# Patient Record
Sex: MALE | Race: WHITE | Employment: OTHER | URBAN - NONMETROPOLITAN AREA
[De-identification: names, ages, dates, MRNs, and addresses within clinical notes are randomized per-mention and may not be internally consistent; named-entity substitution may affect disease eponyms.]

---

## 2017-01-01 ENCOUNTER — HOSPITAL ENCOUNTER (OUTPATIENT)
Dept: LAB | Age: 82
Discharge: HOME | End: 2017-01-31
Attending: FAMILY MEDICINE | Admitting: FAMILY MEDICINE

## 2017-01-06 LAB
INR BLD: 2.82 INDEX
PROTHROMBIN TIME: 34.1 SECONDS (ref 10–14.3)

## 2017-02-01 ENCOUNTER — HOSPITAL ENCOUNTER (OUTPATIENT)
Dept: LAB | Age: 82
Discharge: HOME | End: 2017-02-28
Attending: FAMILY MEDICINE | Admitting: FAMILY MEDICINE

## 2017-02-02 LAB
INR BLD: 2.75 INDEX
PROTHROMBIN TIME: 33.1 SECONDS (ref 10–14.3)

## 2017-02-20 ENCOUNTER — OFFICE VISIT (OUTPATIENT)
Dept: CARDIOLOGY CLINIC | Age: 82
End: 2017-02-20

## 2017-02-20 VITALS
HEIGHT: 70 IN | HEART RATE: 60 BPM | DIASTOLIC BLOOD PRESSURE: 58 MMHG | SYSTOLIC BLOOD PRESSURE: 126 MMHG | WEIGHT: 163 LBS | BODY MASS INDEX: 23.34 KG/M2

## 2017-02-20 DIAGNOSIS — R07.9 CHEST PAIN, UNSPECIFIED TYPE: ICD-10-CM

## 2017-02-20 DIAGNOSIS — I48.0 PAF (PAROXYSMAL ATRIAL FIBRILLATION) (HCC): Primary | ICD-10-CM

## 2017-02-20 DIAGNOSIS — I25.83 CORONARY ARTERY DISEASE DUE TO LIPID RICH PLAQUE: ICD-10-CM

## 2017-02-20 DIAGNOSIS — Z95.0 CARDIAC PACEMAKER: ICD-10-CM

## 2017-02-20 DIAGNOSIS — I25.10 CORONARY ARTERY DISEASE DUE TO LIPID RICH PLAQUE: ICD-10-CM

## 2017-02-20 DIAGNOSIS — H91.93 HOH (HARD OF HEARING), BILATERAL: ICD-10-CM

## 2017-02-20 DIAGNOSIS — I10 ESSENTIAL HYPERTENSION: ICD-10-CM

## 2017-02-20 PROCEDURE — 99214 OFFICE O/P EST MOD 30 MIN: CPT | Performed by: INTERNAL MEDICINE

## 2017-02-20 RX ORDER — ISOSORBIDE MONONITRATE 30 MG/1
60 TABLET, EXTENDED RELEASE ORAL DAILY
Qty: 90 TABLET | Refills: 3 | Status: SHIPPED | OUTPATIENT
Start: 2017-02-20 | End: 2018-03-26 | Stop reason: SDUPTHER

## 2017-02-21 ENCOUNTER — HOSPITAL ENCOUNTER (OUTPATIENT)
Dept: LAB | Age: 82
Discharge: HOME | End: 2017-02-21
Attending: INTERNAL MEDICINE | Admitting: INTERNAL MEDICINE

## 2017-02-21 LAB
ALBUMIN SERPL-MCNC: 4.4 GM/DL (ref 3.4–5)
ALP BLD-CCNC: 78 IU/L (ref 40–129)
ALT SERPL-CCNC: 14 U/L (ref 10–40)
ANION GAP SERPL CALCULATED.3IONS-SCNC: 9 MMOL/L (ref 4–16)
AST SERPL-CCNC: 25 IU/L (ref 15–37)
BASOPHILS ABSOLUTE: 0 K/CU MM
BASOPHILS RELATIVE PERCENT: 0.4 % (ref 0–1)
BILIRUB SERPL-MCNC: 0.5 MG/DL (ref 0–1)
BUN BLDV-MCNC: 24 MG/DL (ref 6–23)
CALCIUM SERPL-MCNC: 9.3 MG/DL (ref 8.3–10.6)
CHLORIDE BLD-SCNC: 98 MMOL/L (ref 99–110)
CO2: 31 MMOL/L (ref 21–32)
CREAT SERPL-MCNC: 1.2 MG/DL (ref 0.9–1.3)
DIFFERENTIAL TYPE: ABNORMAL
EOSINOPHILS ABSOLUTE: 0.1 K/CU MM
EOSINOPHILS RELATIVE PERCENT: 1.2 % (ref 0–3)
GFR AFRICAN AMERICAN: >60 ML/MIN/1.73M2
GFR NON-AFRICAN AMERICAN: 57 ML/MIN/1.73M2
GLUCOSE FASTING: 110 MG/DL (ref 70–99)
HCT VFR BLD CALC: 43.7 % (ref 42–52)
HEMOGLOBIN: 14.4 GM/DL (ref 13.5–18)
IMMATURE NEUTROPHIL %: 0.2 % (ref 0–0.43)
LYMPHOCYTES ABSOLUTE: 1.1 K/CU MM
LYMPHOCYTES RELATIVE PERCENT: 21.8 % (ref 24–44)
MCH RBC QN AUTO: 30.3 PG (ref 27–31)
MCHC RBC AUTO-ENTMCNC: 33 % (ref 32–36)
MCV RBC AUTO: 92 FL (ref 78–100)
MONOCYTES ABSOLUTE: 0.8 K/CU MM
MONOCYTES RELATIVE PERCENT: 15.1 % (ref 0–4)
PDW BLD-RTO: 13.5 % (ref 11.7–14.9)
PLATELET # BLD: 152 K/CU MM (ref 140–440)
PMV BLD AUTO: 9.5 FL (ref 7.5–11.1)
POTASSIUM SERPL-SCNC: 4.1 MMOL/L (ref 3.5–5.1)
RBC # BLD: 4.75 M/CU MM (ref 4.6–6.2)
SEGMENTED NEUTROPHILS ABSOLUTE COUNT: 3.2 K/CU MM
SEGMENTED NEUTROPHILS RELATIVE PERCENT: 61.3 % (ref 36–66)
SODIUM BLD-SCNC: 138 MMOL/L (ref 135–145)
TOTAL IMMATURE NEUTOROPHIL: 0.01 K/CU MM
TOTAL PROTEIN: 7 GM/DL (ref 6.4–8.2)
TSH HIGH SENSITIVITY: 4.61 UIU/ML (ref 0.27–4.2)
WBC # BLD: 5.2 K/CU MM (ref 4–10.5)

## 2017-02-28 ENCOUNTER — PROCEDURE VISIT (OUTPATIENT)
Dept: CARDIOLOGY CLINIC | Age: 82
End: 2017-02-28

## 2017-02-28 ENCOUNTER — TELEPHONE (OUTPATIENT)
Dept: CARDIOLOGY CLINIC | Age: 82
End: 2017-02-28

## 2017-02-28 DIAGNOSIS — R07.89 OTHER CHEST PAIN: Primary | ICD-10-CM

## 2017-02-28 LAB
LV EF: 63 %
LVEF MODALITY: NORMAL

## 2017-02-28 PROCEDURE — 93015 CV STRESS TEST SUPVJ I&R: CPT | Performed by: INTERNAL MEDICINE

## 2017-02-28 PROCEDURE — A9500 TC99M SESTAMIBI: HCPCS | Performed by: INTERNAL MEDICINE

## 2017-02-28 PROCEDURE — 78452 HT MUSCLE IMAGE SPECT MULT: CPT | Performed by: INTERNAL MEDICINE

## 2017-03-01 ENCOUNTER — HOSPITAL ENCOUNTER (OUTPATIENT)
Dept: LAB | Age: 82
Discharge: HOME | End: 2017-03-31
Attending: FAMILY MEDICINE | Admitting: FAMILY MEDICINE

## 2017-03-03 LAB
INR BLD: 2.78 INDEX
PROTHROMBIN TIME: 32.7 SECONDS (ref 10–14.3)

## 2017-03-27 ENCOUNTER — OFFICE VISIT (OUTPATIENT)
Dept: CARDIOLOGY CLINIC | Age: 82
End: 2017-03-27

## 2017-03-27 VITALS
DIASTOLIC BLOOD PRESSURE: 66 MMHG | RESPIRATION RATE: 16 BRPM | HEART RATE: 76 BPM | WEIGHT: 160 LBS | BODY MASS INDEX: 22.9 KG/M2 | SYSTOLIC BLOOD PRESSURE: 120 MMHG | HEIGHT: 70 IN

## 2017-03-27 DIAGNOSIS — I25.83 CORONARY ARTERY DISEASE DUE TO LIPID RICH PLAQUE: ICD-10-CM

## 2017-03-27 DIAGNOSIS — Z95.0 CARDIAC PACEMAKER: ICD-10-CM

## 2017-03-27 DIAGNOSIS — I10 ESSENTIAL HYPERTENSION: ICD-10-CM

## 2017-03-27 DIAGNOSIS — I48.0 PAF (PAROXYSMAL ATRIAL FIBRILLATION) (HCC): Primary | ICD-10-CM

## 2017-03-27 DIAGNOSIS — I44.2 CHB (COMPLETE HEART BLOCK) (HCC): ICD-10-CM

## 2017-03-27 DIAGNOSIS — I25.10 CORONARY ARTERY DISEASE DUE TO LIPID RICH PLAQUE: ICD-10-CM

## 2017-03-27 PROCEDURE — 99214 OFFICE O/P EST MOD 30 MIN: CPT | Performed by: INTERNAL MEDICINE

## 2017-03-27 RX ORDER — RAMIPRIL 5 MG/1
CAPSULE ORAL
Qty: 90 CAPSULE | Refills: 3 | Status: SHIPPED | OUTPATIENT
Start: 2017-03-27 | End: 2018-04-02 | Stop reason: SDUPTHER

## 2017-03-27 RX ORDER — ATENOLOL 50 MG/1
75 TABLET ORAL DAILY
Qty: 135 TABLET | Refills: 3 | Status: SHIPPED | OUTPATIENT
Start: 2017-03-27 | End: 2018-03-26 | Stop reason: SDUPTHER

## 2017-03-27 RX ORDER — PRAVASTATIN SODIUM 40 MG
TABLET ORAL
Qty: 90 TABLET | Refills: 3 | Status: SHIPPED | OUTPATIENT
Start: 2017-03-27 | End: 2018-04-02 | Stop reason: SDUPTHER

## 2017-04-01 ENCOUNTER — HOSPITAL ENCOUNTER (OUTPATIENT)
Dept: LAB | Age: 82
Discharge: HOME | End: 2017-04-30
Attending: FAMILY MEDICINE | Admitting: FAMILY MEDICINE

## 2017-04-02 ENCOUNTER — PROCEDURE VISIT (OUTPATIENT)
Dept: CARDIOLOGY CLINIC | Age: 82
End: 2017-04-02

## 2017-04-02 DIAGNOSIS — Z95.0 CARDIAC PACEMAKER IN SITU: ICD-10-CM

## 2017-04-02 DIAGNOSIS — I44.30 AV BLOCK: Primary | ICD-10-CM

## 2017-04-02 PROCEDURE — 93294 REM INTERROG EVL PM/LDLS PM: CPT | Performed by: INTERNAL MEDICINE

## 2017-04-02 PROCEDURE — 93296 REM INTERROG EVL PM/IDS: CPT | Performed by: INTERNAL MEDICINE

## 2017-04-05 LAB
INR BLD: 3.37 INDEX
PROTHROMBIN TIME: 39.9 SECONDS (ref 10–14.3)

## 2017-04-26 RX ORDER — ISOSORBIDE MONONITRATE 30 MG/1
TABLET, EXTENDED RELEASE ORAL
Qty: 90 TABLET | Refills: 2 | Status: SHIPPED | OUTPATIENT
Start: 2017-04-26 | End: 2017-10-02 | Stop reason: SDUPTHER

## 2017-05-01 ENCOUNTER — HOSPITAL ENCOUNTER (OUTPATIENT)
Dept: LAB | Age: 82
Discharge: HOME | End: 2017-05-31
Attending: FAMILY MEDICINE | Admitting: FAMILY MEDICINE

## 2017-05-01 LAB
INR BLD: 3.06 INDEX
PROTHROMBIN TIME: 36.1 SECONDS (ref 10–14.3)

## 2017-06-01 ENCOUNTER — HOSPITAL ENCOUNTER (OUTPATIENT)
Dept: LAB | Age: 82
Discharge: HOME | End: 2017-06-30
Attending: FAMILY MEDICINE | Admitting: FAMILY MEDICINE

## 2017-06-05 LAB
INR BLD: 3.21 INDEX
PROTHROMBIN TIME: 37.9 SECONDS (ref 10–14.3)

## 2017-07-01 ENCOUNTER — HOSPITAL ENCOUNTER (OUTPATIENT)
Dept: LAB | Age: 82
Discharge: HOME | End: 2017-07-31
Attending: FAMILY MEDICINE | Admitting: FAMILY MEDICINE

## 2017-07-03 LAB
INR BLD: 3.01 INDEX
PROTHROMBIN TIME: 35.5 SECONDS (ref 10–14.3)

## 2017-07-09 ENCOUNTER — PROCEDURE VISIT (OUTPATIENT)
Dept: CARDIOLOGY CLINIC | Age: 82
End: 2017-07-09

## 2017-07-09 DIAGNOSIS — Z95.0 CARDIAC PACEMAKER IN SITU: ICD-10-CM

## 2017-07-09 DIAGNOSIS — I44.30 AV BLOCK: Primary | ICD-10-CM

## 2017-07-09 PROCEDURE — 93296 REM INTERROG EVL PM/IDS: CPT | Performed by: INTERNAL MEDICINE

## 2017-07-09 PROCEDURE — 93294 REM INTERROG EVL PM/LDLS PM: CPT | Performed by: INTERNAL MEDICINE

## 2017-08-01 ENCOUNTER — HOSPITAL ENCOUNTER (OUTPATIENT)
Dept: LAB | Age: 82
Discharge: HOME | End: 2017-08-31
Attending: FAMILY MEDICINE | Admitting: FAMILY MEDICINE

## 2017-08-07 LAB
INR BLD: 2.5 INDEX
PROTHROMBIN TIME: 29.3 SECONDS (ref 10–14.3)

## 2017-09-01 ENCOUNTER — HOSPITAL ENCOUNTER (OUTPATIENT)
Dept: LAB | Age: 82
Discharge: HOME | End: 2017-09-30
Attending: FAMILY MEDICINE | Admitting: FAMILY MEDICINE

## 2017-09-05 LAB
INR BLD: 3.11 INDEX
PROTHROMBIN TIME: 36.7 SECONDS (ref 10–14.3)

## 2017-09-07 ENCOUNTER — TELEPHONE (OUTPATIENT)
Dept: CARDIOLOGY CLINIC | Age: 82
End: 2017-09-07

## 2017-10-01 ENCOUNTER — HOSPITAL ENCOUNTER (OUTPATIENT)
Dept: LAB | Age: 82
Discharge: HOME | End: 2017-10-31
Attending: FAMILY MEDICINE | Admitting: FAMILY MEDICINE

## 2017-10-02 ENCOUNTER — OFFICE VISIT (OUTPATIENT)
Dept: CARDIOLOGY CLINIC | Age: 82
End: 2017-10-02

## 2017-10-02 VITALS
WEIGHT: 158 LBS | BODY MASS INDEX: 22.62 KG/M2 | RESPIRATION RATE: 16 BRPM | HEIGHT: 70 IN | DIASTOLIC BLOOD PRESSURE: 78 MMHG | SYSTOLIC BLOOD PRESSURE: 136 MMHG | HEART RATE: 72 BPM

## 2017-10-02 DIAGNOSIS — I10 ESSENTIAL HYPERTENSION: ICD-10-CM

## 2017-10-02 DIAGNOSIS — I48.0 PAF (PAROXYSMAL ATRIAL FIBRILLATION) (HCC): ICD-10-CM

## 2017-10-02 DIAGNOSIS — Z95.0 CARDIAC PACEMAKER: ICD-10-CM

## 2017-10-02 DIAGNOSIS — I25.83 CORONARY ARTERY DISEASE DUE TO LIPID RICH PLAQUE: Primary | ICD-10-CM

## 2017-10-02 DIAGNOSIS — I25.10 CORONARY ARTERY DISEASE DUE TO LIPID RICH PLAQUE: Primary | ICD-10-CM

## 2017-10-02 PROCEDURE — 99214 OFFICE O/P EST MOD 30 MIN: CPT | Performed by: INTERNAL MEDICINE

## 2017-10-02 NOTE — PATIENT INSTRUCTIONS
Continue current cardiovascular medications which have been reviewed and discussed individually with you. Continue device check as per care link schedule. Primary/secondary prevention is the goal by aggressive risk modification, healthy and therapeutic life style changes for cardiovascular risk reduction. Various goals are discussed and questions answered. Appropriate prescriptions if needed on this visit are addressed. After visit summery is provided. Questions answered and patient verbalizes understanding. Follow up in office in 6 months, sooner if needed.

## 2017-10-02 NOTE — PROGRESS NOTES
regularly. Continue current cardiovascular medications which have been reviewed and discussed individually with you. Continue device check as per care link schedule. Primary/secondary prevention is the goal by aggressive risk modification, healthy and therapeutic life style changes for cardiovascular risk reduction. Various goals are discussed and questions answered. Appropriate prescriptions if needed on this visit are addressed. After visit summery is provided. Questions answered and patient verbalizes understanding. Follow up in office in 6 months, sooner if needed.     Noel Flannery MD, 10/2/2017 11:44 AM

## 2017-10-02 NOTE — MR AVS SNAPSHOT
reduction. Various goals are discussed and questions answered. Appropriate prescriptions if needed on this visit are addressed. After visit summery is provided. Questions answered and patient verbalizes understanding. Follow up in office in 6 months, sooner if needed. Medications and Orders      Your Current Medications Are              ramipril (ALTACE) 5 MG capsule TAKE 1 CAPSULE DAILY    pravastatin (PRAVACHOL) 40 MG tablet TAKE ONE TABLET BY MOUTH EVERY DAY    atenolol (TENORMIN) 50 MG tablet Take 1.5 tablets by mouth daily    isosorbide mononitrate (IMDUR) 30 MG extended release tablet Take 2 tablets by mouth daily    warfarin (COUMADIN) 5 MG tablet As directed per Dr. Bautista Wilder (Dr. Bautista Wilder monitors PT/INR). nitroGLYCERIN (NITROSTAT) 0.4 MG SL tablet Place 1 tablet under the tongue every 5 minutes as needed. hydrochlorothiazide (HYDRODIURIL) 12.5 MG tablet Take 12.5 mg by mouth daily. Multiple Vitamin (MULTI-VITAMIN PO) Take 1 tablet by mouth daily. loratadine (CLARITIN) 10 MG tablet Take 10 mg by mouth daily. aspirin 81 MG EC tablet Take 81 mg by mouth daily. omeprazole (PRILOSEC) 20 MG capsule Take 20 mg by mouth daily.         Allergies              Pcn [Penicillins] Diarrhea    Zocor [Simvastatin - High Dose]          Additional Information        Basic Information     Date Of Birth Sex Race Ethnicity Preferred Language    3/5/1929 Male White Non-/Non  English      Problem List as of 10/2/2017  Date Reviewed: 10/2/2017                Chest pain    PAF (paroxysmal atrial fibrillation) (HCC)    [place] (hard of hearing)    Irregular heart beat    CAD (coronary artery disease)    Hypertension    Cardiac pacemaker    CHB (complete heart block) (HCC)      Preventive Care        Date Due    Tetanus Combination Vaccine (1 - Tdap) [birth year]/1948    Zoster Vaccine 3/5/1989    Pneumococcal Vaccines (two) for all adults aged 72 and over (1 of 2 - PCV13) 3/5/1994 Yearly Flu Vaccine (1) 9/1/2017            MyChart Signup           Our records indicate that you have declined MyChart signup.

## 2017-10-04 LAB
INR BLD: 4.16 INDEX
PROTHROMBIN TIME: 49.6 SECONDS (ref 10–14.3)

## 2017-10-06 ENCOUNTER — HOSPITAL ENCOUNTER (OUTPATIENT)
Dept: GENERAL RADIOLOGY | Age: 82
Discharge: HOME | End: 2017-10-06
Attending: FAMILY MEDICINE | Admitting: FAMILY MEDICINE

## 2017-10-06 DIAGNOSIS — R52 PAIN: ICD-10-CM

## 2017-10-15 ENCOUNTER — PROCEDURE VISIT (OUTPATIENT)
Dept: CARDIOLOGY CLINIC | Age: 82
End: 2017-10-15

## 2017-10-15 DIAGNOSIS — I48.19 PERSISTENT ATRIAL FIBRILLATION (HCC): ICD-10-CM

## 2017-10-15 DIAGNOSIS — Z95.0 CARDIAC PACEMAKER IN SITU: ICD-10-CM

## 2017-10-15 DIAGNOSIS — I44.30 AV BLOCK: Primary | ICD-10-CM

## 2017-10-15 PROCEDURE — 93296 REM INTERROG EVL PM/IDS: CPT | Performed by: INTERNAL MEDICINE

## 2017-10-15 PROCEDURE — 93294 REM INTERROG EVL PM/LDLS PM: CPT | Performed by: INTERNAL MEDICINE

## 2017-11-01 ENCOUNTER — HOSPITAL ENCOUNTER (OUTPATIENT)
Dept: LAB | Age: 82
Discharge: HOME | End: 2017-11-30
Attending: FAMILY MEDICINE | Admitting: FAMILY MEDICINE

## 2017-11-06 LAB
INR BLD: 2.41 INDEX
PROTHROMBIN TIME: 28.2 SECONDS (ref 10–14.3)

## 2017-12-01 ENCOUNTER — HOSPITAL ENCOUNTER (OUTPATIENT)
Dept: LAB | Age: 82
Discharge: HOME | End: 2017-12-31
Attending: FAMILY MEDICINE | Admitting: FAMILY MEDICINE

## 2017-12-04 LAB
INR BLD: 2.67 INDEX
PROTHROMBIN TIME: 30.1 SECONDS (ref 10–14.3)

## 2018-01-01 ENCOUNTER — HOSPITAL ENCOUNTER (OUTPATIENT)
Dept: LAB | Age: 83
Discharge: HOME | End: 2018-01-31
Attending: FAMILY MEDICINE | Admitting: FAMILY MEDICINE

## 2018-01-02 LAB
INR BLD: 2.83 INDEX
PROTHROMBIN TIME: 31.9 SECONDS (ref 10–14.3)

## 2018-01-21 ENCOUNTER — PROCEDURE VISIT (OUTPATIENT)
Dept: CARDIOLOGY CLINIC | Age: 83
End: 2018-01-21

## 2018-01-21 DIAGNOSIS — Z95.0 CARDIAC PACEMAKER IN SITU: ICD-10-CM

## 2018-01-21 DIAGNOSIS — I44.30 AV BLOCK: Primary | ICD-10-CM

## 2018-01-21 PROCEDURE — 93294 REM INTERROG EVL PM/LDLS PM: CPT | Performed by: INTERNAL MEDICINE

## 2018-01-21 PROCEDURE — 93296 REM INTERROG EVL PM/IDS: CPT | Performed by: INTERNAL MEDICINE

## 2018-02-01 ENCOUNTER — HOSPITAL ENCOUNTER (OUTPATIENT)
Dept: LAB | Age: 83
Discharge: HOME | End: 2018-02-28
Attending: FAMILY MEDICINE | Admitting: FAMILY MEDICINE

## 2018-02-01 LAB
INR BLD: 2.2 INDEX
PROTHROMBIN TIME: 24.9 SECONDS (ref 10–14.3)

## 2018-03-01 ENCOUNTER — HOSPITAL ENCOUNTER (OUTPATIENT)
Dept: LAB | Age: 83
Discharge: HOME | End: 2018-03-31
Attending: FAMILY MEDICINE | Admitting: FAMILY MEDICINE

## 2018-03-05 LAB
INR BLD: 2.6 INDEX
PROTHROMBIN TIME: 29.4 SECONDS (ref 10–14.3)

## 2018-03-16 ENCOUNTER — HOSPITAL ENCOUNTER (OUTPATIENT)
Dept: CT IMAGING | Age: 83
Discharge: HOME | End: 2018-03-16
Attending: OTOLARYNGOLOGY | Admitting: OTOLARYNGOLOGY

## 2018-03-16 DIAGNOSIS — R22.0 LOCALIZED SWELLING OF HEAD: ICD-10-CM

## 2018-03-16 DIAGNOSIS — R22.0 LOCALIZED SWELLING, MASS, AND LUMP OF HEAD: ICD-10-CM

## 2018-03-16 LAB
GFR AFRICAN AMERICAN: 58 ML/MIN/1.73M2
GFR NON-AFRICAN AMERICAN: 48 ML/MIN/1.73M2
POC CREATININE: 1.4 MG/DL (ref 0.9–1.3)

## 2018-03-26 RX ORDER — ATENOLOL 50 MG/1
TABLET ORAL
Qty: 135 TABLET | Refills: 3 | Status: SHIPPED | OUTPATIENT
Start: 2018-03-26 | End: 2019-01-08 | Stop reason: SDUPTHER

## 2018-03-26 RX ORDER — ISOSORBIDE MONONITRATE 30 MG/1
TABLET, EXTENDED RELEASE ORAL
Qty: 180 TABLET | Refills: 3 | Status: SHIPPED | OUTPATIENT
Start: 2018-03-26 | End: 2019-01-08 | Stop reason: SDUPTHER

## 2018-03-30 ENCOUNTER — HOSPITAL ENCOUNTER (OUTPATIENT)
Dept: SLEEP CENTER | Age: 83
Discharge: HOME | End: 2018-03-30
Attending: PSYCHIATRY & NEUROLOGY | Admitting: PSYCHIATRY & NEUROLOGY

## 2018-03-30 ASSESSMENT — SLEEP AND FATIGUE QUESTIONNAIRES
HOW LIKELY ARE YOU TO NOD OFF OR FALL ASLEEP WHILE SITTING INACTIVE IN A PUBLIC PLACE: 0
ESS TOTAL SCORE: 6
WHAT TIME DO YOU USUALLY GO TO BED: 39600
HOW LIKELY ARE YOU TO NOD OFF OR FALL ASLEEP WHILE SITTING QUIETLY AFTER LUNCH WITHOUT ALCOHOL: 2
NUMBER OF TIMES YOU WAKE PER NIGHT: 1
WHAT TIME DO YOU USUALLY WAKE UP: 25200
DO YOU HAVE HIGH BLOOD PRESSURE: YES
HOW LIKELY ARE YOU TO NOD OFF OR FALL ASLEEP WHEN YOU ARE A PASSENGER IN A CAR FOR AN HOUR WITHOUT A BREAK: 0
FOR THE FIRST 30 MINUTES AFTER WAKING, I AM: SOMEWHAT DROWSY
HOW LIKELY ARE YOU TO NOD OFF OR FALL ASLEEP WHILE WATCHING TV: 1
I SLEEP WELL: YES
HOW LIKELY ARE YOU TO NOD OFF OR FALL ASLEEP WHILE LYING DOWN TO REST IN THE AFTERNOON WHEN CIRCUMSTANCES PERMIT: 2
NORMAL AMOUNT OF SLEEP PER NIGHT: 7
HOW MANY NAPS DO YOU TAKE PER WEEK: 7
HOW LIKELY ARE YOU TO NOD OFF OR FALL ASLEEP WHILE SITTING AND READING: 1
USUAL AMOUNT OF TIME TO FALL ASLEEP (MIN): 15
HOW LIKELY ARE YOU TO NOD OFF OR FALL ASLEEP WHILE SITTING AND TALKING TO SOMEONE: 0
HOW LIKELY ARE YOU TO NOD OFF OR FALL ASLEEP IN A CAR, WHILE STOPPED FOR A FEW MINUTES IN TRAFFIC: 0
I DO OR HAVE BEEN TOLD I SNORE DURING SLEEP: YES, AS AN ADULT OR ADOLESCENT

## 2018-04-01 ENCOUNTER — HOSPITAL ENCOUNTER (OUTPATIENT)
Dept: LAB | Age: 83
Discharge: HOME | End: 2018-04-30
Attending: FAMILY MEDICINE | Admitting: FAMILY MEDICINE

## 2018-04-02 ENCOUNTER — OFFICE VISIT (OUTPATIENT)
Dept: CARDIOLOGY CLINIC | Age: 83
End: 2018-04-02

## 2018-04-02 VITALS
DIASTOLIC BLOOD PRESSURE: 68 MMHG | SYSTOLIC BLOOD PRESSURE: 130 MMHG | RESPIRATION RATE: 14 BRPM | HEART RATE: 68 BPM | HEIGHT: 70 IN | WEIGHT: 162 LBS | BODY MASS INDEX: 23.19 KG/M2

## 2018-04-02 DIAGNOSIS — Z95.0 CARDIAC PACEMAKER: ICD-10-CM

## 2018-04-02 DIAGNOSIS — I49.9 IRREGULAR HEART BEAT: ICD-10-CM

## 2018-04-02 DIAGNOSIS — I10 ESSENTIAL HYPERTENSION: ICD-10-CM

## 2018-04-02 DIAGNOSIS — I48.0 PAF (PAROXYSMAL ATRIAL FIBRILLATION) (HCC): Primary | ICD-10-CM

## 2018-04-02 PROCEDURE — 99214 OFFICE O/P EST MOD 30 MIN: CPT | Performed by: INTERNAL MEDICINE

## 2018-04-02 RX ORDER — PRAVASTATIN SODIUM 40 MG
TABLET ORAL
Qty: 90 TABLET | Refills: 3 | Status: SHIPPED | OUTPATIENT
Start: 2018-04-02 | End: 2019-01-08 | Stop reason: SDUPTHER

## 2018-04-02 RX ORDER — RAMIPRIL 5 MG/1
CAPSULE ORAL
Qty: 90 CAPSULE | Refills: 3 | Status: SHIPPED | OUTPATIENT
Start: 2018-04-02 | End: 2019-01-08 | Stop reason: SDUPTHER

## 2018-04-04 NOTE — PROGRESS NOTES
Results for the most recent sleep study on [facility]  3/5/1929 are finalized and available. Please see media tab.     Electronically signed by Charles Santana on 4/4/2018 at 1:48 PM

## 2018-04-05 ENCOUNTER — HOSPITAL ENCOUNTER (OUTPATIENT)
Dept: LAB | Age: 83
Discharge: HOME | End: 2018-04-05
Attending: FAMILY MEDICINE | Admitting: FAMILY MEDICINE

## 2018-04-05 LAB
ALBUMIN SERPL-MCNC: 4.6 GM/DL (ref 3.4–5)
ALP BLD-CCNC: 82 IU/L (ref 40–129)
ALT SERPL-CCNC: 16 U/L (ref 10–40)
ANION GAP SERPL CALCULATED.3IONS-SCNC: 9 MMOL/L (ref 4–16)
AST SERPL-CCNC: 31 IU/L (ref 15–37)
BILIRUB SERPL-MCNC: 0.5 MG/DL (ref 0–1)
BUN BLDV-MCNC: 26 MG/DL (ref 6–23)
CALCIUM SERPL-MCNC: 9.3 MG/DL (ref 8.3–10.6)
CHLORIDE BLD-SCNC: 99 MMOL/L (ref 99–110)
CHOLESTEROL, FASTING: 139 MG/DL
CO2: 32 MMOL/L (ref 21–32)
CREAT SERPL-MCNC: 1.1 MG/DL (ref 0.9–1.3)
GFR AFRICAN AMERICAN: >60 ML/MIN/1.73M2
GFR NON-AFRICAN AMERICAN: >60 ML/MIN/1.73M2
GLUCOSE FASTING: 107 MG/DL (ref 70–99)
HDLC SERPL-MCNC: 37 MG/DL
INR BLD: 2.45 INDEX
LDL CHOLESTEROL DIRECT: 82 MG/DL
POTASSIUM SERPL-SCNC: 4.7 MMOL/L (ref 3.5–5.1)
PROTHROMBIN TIME: 27.7 SECONDS (ref 10–14.3)
SODIUM BLD-SCNC: 140 MMOL/L (ref 135–145)
TOTAL PROTEIN: 7.3 GM/DL (ref 6.4–8.2)
TRIGLYCERIDE, FASTING: 200 MG/DL
TSH HIGH SENSITIVITY: 5.86 UIU/ML (ref 0.27–4.2)

## 2018-04-17 ENCOUNTER — HOSPITAL ENCOUNTER (OUTPATIENT)
Dept: OTHER | Age: 83
Discharge: HOME | End: 2018-04-17
Attending: INTERNAL MEDICINE | Admitting: INTERNAL MEDICINE

## 2018-04-17 LAB
ALBUMIN SERPL-MCNC: 4.8 GM/DL (ref 3.4–5)
ALP BLD-CCNC: 89 IU/L (ref 40–128)
ALT SERPL-CCNC: 24 U/L (ref 10–40)
ANION GAP SERPL CALCULATED.3IONS-SCNC: 13 MMOL/L (ref 4–16)
AST SERPL-CCNC: 34 IU/L (ref 15–37)
BILIRUB SERPL-MCNC: 0.4 MG/DL (ref 0–1)
BUN BLDV-MCNC: 25 MG/DL (ref 6–23)
CALCIUM SERPL-MCNC: 9.8 MG/DL (ref 8.3–10.6)
CHLORIDE BLD-SCNC: 101 MMOL/L (ref 99–110)
CO2: 27 MMOL/L (ref 21–32)
CREAT SERPL-MCNC: 1.1 MG/DL (ref 0.9–1.3)
GFR AFRICAN AMERICAN: >60 ML/MIN/1.73M2
GFR NON-AFRICAN AMERICAN: >60 ML/MIN/1.73M2
GLUCOSE BLD-MCNC: 107 MG/DL (ref 70–99)
HAV IGM SER IA-ACNC: NON REACTIVE
HEPATITIS B SURFACE ANTIGEN: NON REACTIVE
HEPATITIS C ANTIBODY: NON REACTIVE
LACTATE DEHYDROGENASE: 259 IU/L (ref 120–246)
POTASSIUM SERPL-SCNC: 4.9 MMOL/L (ref 3.5–5.1)
SODIUM BLD-SCNC: 141 MMOL/L (ref 135–145)
TOTAL PROTEIN: 6.9 GM/DL (ref 6.4–8.2)

## 2018-04-19 LAB
BETA-2 MICROGLOBULIN: 3.4
HEPATITIS B CORE TOTAL ANTIBODY: NEGATIVE

## 2018-04-29 ENCOUNTER — PROCEDURE VISIT (OUTPATIENT)
Dept: CARDIOLOGY CLINIC | Age: 83
End: 2018-04-29

## 2018-04-29 DIAGNOSIS — Z95.0 CARDIAC PACEMAKER IN SITU: ICD-10-CM

## 2018-04-29 DIAGNOSIS — I44.30 AV BLOCK: Primary | ICD-10-CM

## 2018-04-29 PROCEDURE — 93296 REM INTERROG EVL PM/IDS: CPT | Performed by: INTERNAL MEDICINE

## 2018-04-29 PROCEDURE — 93294 REM INTERROG EVL PM/LDLS PM: CPT | Performed by: INTERNAL MEDICINE

## 2018-05-01 ENCOUNTER — HOSPITAL ENCOUNTER (OUTPATIENT)
Dept: LAB | Age: 83
Discharge: HOME | End: 2018-05-31
Attending: FAMILY MEDICINE | Admitting: FAMILY MEDICINE

## 2018-05-02 LAB
INR BLD: 2.65 INDEX
PROTHROMBIN TIME: 29.9 SECONDS (ref 10–14.3)

## 2018-05-14 ENCOUNTER — HOSPITAL ENCOUNTER (OUTPATIENT)
Dept: GENERAL RADIOLOGY | Age: 83
Discharge: HOME | End: 2018-05-14
Attending: INTERNAL MEDICINE | Admitting: INTERNAL MEDICINE

## 2018-05-14 DIAGNOSIS — C82.01 FOLLICULAR LYMPHOMA GRADE I, LYMPH NODES OF HEAD, FACE, AND NECK (HCC): ICD-10-CM

## 2018-06-01 ENCOUNTER — HOSPITAL ENCOUNTER (OUTPATIENT)
Dept: LAB | Age: 83
Discharge: HOME | End: 2018-06-30
Attending: FAMILY MEDICINE | Admitting: FAMILY MEDICINE

## 2018-06-04 LAB
INR BLD: 2 INDEX
PROTHROMBIN TIME: 22.6 SECONDS (ref 10–14.3)

## 2018-07-01 ENCOUNTER — HOSPITAL ENCOUNTER (OUTPATIENT)
Dept: LAB | Age: 83
Discharge: HOME | End: 2018-07-31
Attending: FAMILY MEDICINE | Admitting: FAMILY MEDICINE

## 2018-07-05 LAB
INR BLD: 2 INDEX
PROTHROMBIN TIME: 22.6 SECONDS (ref 10–14.3)

## 2018-08-01 ENCOUNTER — HOSPITAL ENCOUNTER (OUTPATIENT)
Dept: LAB | Age: 83
Discharge: HOME | End: 2018-08-31
Attending: FAMILY MEDICINE | Admitting: FAMILY MEDICINE

## 2018-08-05 ENCOUNTER — PROCEDURE VISIT (OUTPATIENT)
Dept: CARDIOLOGY CLINIC | Age: 83
End: 2018-08-05

## 2018-08-05 DIAGNOSIS — I44.30 AV BLOCK: Primary | ICD-10-CM

## 2018-08-05 DIAGNOSIS — Z95.0 CARDIAC PACEMAKER IN SITU: ICD-10-CM

## 2018-08-05 PROCEDURE — 93296 REM INTERROG EVL PM/IDS: CPT | Performed by: INTERNAL MEDICINE

## 2018-08-05 PROCEDURE — 93294 REM INTERROG EVL PM/LDLS PM: CPT | Performed by: INTERNAL MEDICINE

## 2018-08-06 LAB
INR BLD: 2.28 INDEX
PROTHROMBIN TIME: 25.8 SECONDS (ref 10–14.3)

## 2018-09-01 ENCOUNTER — HOSPITAL ENCOUNTER (OUTPATIENT)
Dept: LAB | Age: 83
Discharge: HOME | End: 2018-09-01
Attending: FAMILY MEDICINE | Admitting: FAMILY MEDICINE

## 2018-09-04 LAB
INR BLD: 2.47 INDEX
PROTHROMBIN TIME: 27.9 SECONDS (ref 10–14.3)

## 2018-10-01 ENCOUNTER — OFFICE VISIT (OUTPATIENT)
Dept: CARDIOLOGY CLINIC | Age: 83
End: 2018-10-01
Payer: MEDICARE

## 2018-10-01 VITALS
SYSTOLIC BLOOD PRESSURE: 126 MMHG | HEART RATE: 56 BPM | RESPIRATION RATE: 16 BRPM | BODY MASS INDEX: 22.62 KG/M2 | HEIGHT: 70 IN | DIASTOLIC BLOOD PRESSURE: 64 MMHG | WEIGHT: 158 LBS

## 2018-10-01 DIAGNOSIS — I10 ESSENTIAL HYPERTENSION: ICD-10-CM

## 2018-10-01 DIAGNOSIS — I48.19 PERSISTENT ATRIAL FIBRILLATION (HCC): ICD-10-CM

## 2018-10-01 DIAGNOSIS — C81.08 NODULAR LYMPHOCYTE PREDOMINANT HODGKIN LYMPHOMA OF LYMPH NODES OF MULTIPLE REGIONS (HCC): ICD-10-CM

## 2018-10-01 DIAGNOSIS — I25.10 CORONARY ARTERY DISEASE INVOLVING NATIVE CORONARY ARTERY OF NATIVE HEART WITHOUT ANGINA PECTORIS: ICD-10-CM

## 2018-10-01 DIAGNOSIS — I48.0 PAF (PAROXYSMAL ATRIAL FIBRILLATION) (HCC): Primary | ICD-10-CM

## 2018-10-01 DIAGNOSIS — Z95.0 CARDIAC PACEMAKER: ICD-10-CM

## 2018-10-01 PROCEDURE — 99214 OFFICE O/P EST MOD 30 MIN: CPT | Performed by: INTERNAL MEDICINE

## 2018-10-01 RX ORDER — RANOLAZINE 500 MG/1
500 TABLET, EXTENDED RELEASE ORAL 2 TIMES DAILY
Qty: 60 TABLET | Refills: 3 | Status: SHIPPED | OUTPATIENT
Start: 2018-10-01 | End: 2019-01-08 | Stop reason: SDUPTHER

## 2018-10-01 NOTE — PROGRESS NOTES
Tea Murphy Fitzrandolph  3/5/1929  Roxy Childers MD    Chief complaint and HPI:  Sheba Zamora  is a 80-year-old male follows up for paroxysmal atrial fibrillation, known coronary artery disease with the angina pectoralis, hypertension and hyperlipidemia. He reports that he is taking the nitroglycerin more frequently. He gets chest pain with certain routine activities and he takes a nitroglycerin with relief. He can continue to do those activities with help of nitroglycerin which times he has taken up to 2 a day. He denies any nausea vomiting or shortness of breath. He is given some activities like push mowing his lawn which she could not do it due to angina. He does stress test the right last year which did not show any significant ischemia. He has been very compliant to his medications. He does not smoke. He is short of breath easily with activities. He is also diagnosed to have recurrence of lymphoma. He had a swelling in front of left ear for which she saw Dr. Jennifer Odmo ENT and biopsy was done twice was positive for lymphoma. This was followed up with CAT scan and PET scan which confirmed left axillary lymphadenopathy as well. He has seen Dr. Cornell Montgomery, oncologist.  He has not been started on any chemotherapy yet. Patient was wondering if he can tolerate chemotherapy as well as the heart is concerned. Patient has a pacemaker for complete heart block and he follows up regularly [facility]. Rest of the Cardiovascular system review is otherwise unchanged from prior encounter. Past medical history:  has a past medical history of CAD (coronary artery disease); Cancer ([address] Cardiac pacemaker; CHB (complete heart block) ([address] H/O cardiac catheterization; H/O cardiovascular stress test; H/O chest x-ray; H/O echocardiogram; History of PTCA 1; [place] (hard of hearing); Hyperlipidemia; Hypertension; Lymphoma ([address] Mobitz type 2 second degree atrioventricular block;  Persistent atrial

## 2018-10-01 NOTE — ASSESSMENT & PLAN NOTE
Angina frequency has increased. We'll add Ranexa 500 mg twice a day if tolerated. Eventual side effects are discussed. He is to continue other current medications.

## 2018-10-02 ENCOUNTER — HOSPITAL ENCOUNTER (OUTPATIENT)
Age: 83
Discharge: HOME | End: 2018-10-02
Payer: MEDICARE

## 2018-10-02 LAB
INR BLD: 2.43 INDEX
PROTHROMBIN TIME: 27.5 SECONDS (ref 9.12–12.5)

## 2018-10-02 PROCEDURE — 36415 COLL VENOUS BLD VENIPUNCTURE: CPT

## 2018-10-02 PROCEDURE — 85610 PROTHROMBIN TIME: CPT

## 2018-10-10 ENCOUNTER — HOSPITAL ENCOUNTER (OUTPATIENT)
Age: 83
Discharge: HOME | End: 2018-10-10
Payer: MEDICARE

## 2018-10-10 ENCOUNTER — HOSPITAL ENCOUNTER (OUTPATIENT)
Dept: CT IMAGING | Age: 83
Discharge: HOME | End: 2018-10-10
Payer: MEDICARE

## 2018-10-10 DIAGNOSIS — C82.01 FOLLICULAR LYMPHOMA GRADE I, LYMPH NODES OF HEAD, FACE, AND NECK (HCC): ICD-10-CM

## 2018-10-10 LAB
ALBUMIN SERPL-MCNC: 4.5 GM/DL (ref 3.4–5)
ALP BLD-CCNC: 100 IU/L (ref 40–129)
ALT SERPL-CCNC: 18 U/L (ref 10–40)
ANION GAP SERPL CALCULATED.3IONS-SCNC: 13 MMOL/L (ref 4–16)
AST SERPL-CCNC: 44 IU/L (ref 15–37)
BASOPHILS ABSOLUTE: 0 K/CU MM
BASOPHILS RELATIVE PERCENT: 0.6 % (ref 0–1)
BILIRUB SERPL-MCNC: 0.5 MG/DL (ref 0–1)
BUN BLDV-MCNC: 23 MG/DL (ref 6–23)
CALCIUM SERPL-MCNC: 9.9 MG/DL (ref 8.3–10.6)
CHLORIDE BLD-SCNC: 94 MMOL/L (ref 99–110)
CO2: 25 MMOL/L (ref 21–32)
CREAT SERPL-MCNC: 1.2 MG/DL (ref 0.9–1.3)
DIFFERENTIAL TYPE: ABNORMAL
EOSINOPHILS ABSOLUTE: 0.1 K/CU MM
EOSINOPHILS RELATIVE PERCENT: 1.9 % (ref 0–3)
GFR AFRICAN AMERICAN: >60 ML/MIN/1.73M2
GFR AFRICAN AMERICAN: >60 ML/MIN/1.73M2
GFR NON-AFRICAN AMERICAN: 50 ML/MIN/1.73M2
GFR NON-AFRICAN AMERICAN: 57 ML/MIN/1.73M2
GLUCOSE FASTING: 102 MG/DL (ref 70–99)
HCT VFR BLD CALC: 40.9 % (ref 42–52)
HEMOGLOBIN: 12.7 GM/DL (ref 13.5–18)
IMMATURE NEUTROPHIL %: 0.6 % (ref 0–0.43)
LACTATE DEHYDROGENASE: 361 IU/L (ref 120–246)
LYMPHOCYTES ABSOLUTE: 0.7 K/CU MM
LYMPHOCYTES RELATIVE PERCENT: 14.1 % (ref 24–44)
MCH RBC QN AUTO: 30.5 PG (ref 27–31)
MCHC RBC AUTO-ENTMCNC: 31.1 % (ref 32–36)
MCV RBC AUTO: 98.1 FL (ref 78–100)
MONOCYTES ABSOLUTE: 0.8 K/CU MM
MONOCYTES RELATIVE PERCENT: 17.2 % (ref 0–4)
NUCLEATED RBC %: 0 %
PDW BLD-RTO: 13.6 % (ref 11.7–14.9)
PLATELET # BLD: 147 K/CU MM (ref 140–440)
PMV BLD AUTO: 10.8 FL (ref 7.5–11.1)
POC CREATININE: 1.3 MG/DL (ref 0.9–1.3)
POTASSIUM SERPL-SCNC: 5.3 MMOL/L (ref 3.5–5.1)
RBC # BLD: 4.17 M/CU MM (ref 4.6–6.2)
SEGMENTED NEUTROPHILS ABSOLUTE COUNT: 3.1 K/CU MM
SEGMENTED NEUTROPHILS RELATIVE PERCENT: 65.6 % (ref 36–66)
SODIUM BLD-SCNC: 132 MMOL/L (ref 135–145)
TOTAL IMMATURE NEUTOROPHIL: 0.03 K/CU MM
TOTAL NUCLEATED RBC: 0 K/CU MM
TOTAL PROTEIN: 6.6 GM/DL (ref 6.4–8.2)
WBC # BLD: 4.8 K/CU MM (ref 4–10.5)

## 2018-10-10 PROCEDURE — 36415 COLL VENOUS BLD VENIPUNCTURE: CPT

## 2018-10-10 PROCEDURE — 83615 LACTATE (LD) (LDH) ENZYME: CPT

## 2018-10-10 PROCEDURE — 85025 COMPLETE CBC W/AUTO DIFF WBC: CPT

## 2018-10-10 PROCEDURE — 6360000004 HC RX CONTRAST MEDICATION: Performed by: INTERNAL MEDICINE

## 2018-10-10 PROCEDURE — 74177 CT ABD & PELVIS W/CONTRAST: CPT

## 2018-10-10 PROCEDURE — 71260 CT THORAX DX C+: CPT

## 2018-10-10 PROCEDURE — 80053 COMPREHEN METABOLIC PANEL: CPT

## 2018-10-10 RX ADMIN — IOHEXOL 50 ML: 240 INJECTION, SOLUTION INTRATHECAL; INTRAVASCULAR; INTRAVENOUS; ORAL at 13:37

## 2018-10-10 RX ADMIN — IOPAMIDOL 75 ML: 755 INJECTION, SOLUTION INTRAVENOUS at 13:38

## 2018-11-02 ENCOUNTER — HOSPITAL ENCOUNTER (OUTPATIENT)
Age: 83
Discharge: HOME | End: 2018-11-02
Payer: MEDICARE

## 2018-11-02 LAB
INR BLD: 2.07 INDEX
PROTHROMBIN TIME: 23.4 SECONDS (ref 9.12–12.5)

## 2018-11-02 PROCEDURE — 85610 PROTHROMBIN TIME: CPT

## 2018-11-02 PROCEDURE — 36415 COLL VENOUS BLD VENIPUNCTURE: CPT

## 2018-11-08 ENCOUNTER — TELEPHONE (OUTPATIENT)
Dept: CARDIOLOGY CLINIC | Age: 83
End: 2018-11-08

## 2018-11-11 ENCOUNTER — PROCEDURE VISIT (OUTPATIENT)
Dept: CARDIOLOGY CLINIC | Age: 83
End: 2018-11-11
Payer: MEDICARE

## 2018-11-11 DIAGNOSIS — Z95.0 CARDIAC PACEMAKER IN SITU: ICD-10-CM

## 2018-11-11 DIAGNOSIS — I44.30 AV BLOCK: Primary | ICD-10-CM

## 2018-11-12 PROCEDURE — 93296 REM INTERROG EVL PM/IDS: CPT | Performed by: INTERNAL MEDICINE

## 2018-11-12 PROCEDURE — 93294 REM INTERROG EVL PM/LDLS PM: CPT | Performed by: INTERNAL MEDICINE

## 2018-12-04 ENCOUNTER — HOSPITAL ENCOUNTER (OUTPATIENT)
Age: 83
Discharge: HOME | End: 2018-12-04
Payer: MEDICARE

## 2018-12-04 LAB
INR BLD: 2.81 INDEX
PROTHROMBIN TIME: 31.7 SECONDS (ref 9.12–12.5)

## 2018-12-04 PROCEDURE — 36415 COLL VENOUS BLD VENIPUNCTURE: CPT

## 2018-12-04 PROCEDURE — 85610 PROTHROMBIN TIME: CPT

## 2019-01-04 ENCOUNTER — HOSPITAL ENCOUNTER (OUTPATIENT)
Age: 84
Setting detail: SPECIMEN
Discharge: HOME | End: 2019-01-04
Payer: MEDICARE

## 2019-01-04 LAB
ALBUMIN SERPL-MCNC: 4.5 GM/DL (ref 3.4–5)
ALP BLD-CCNC: 103 IU/L (ref 40–129)
ALT SERPL-CCNC: 15 U/L (ref 10–40)
ANION GAP SERPL CALCULATED.3IONS-SCNC: 13 MMOL/L (ref 4–16)
AST SERPL-CCNC: 27 IU/L (ref 15–37)
BASOPHILS ABSOLUTE: NORMAL /ΜL
BASOPHILS RELATIVE PERCENT: NORMAL %
BILIRUB SERPL-MCNC: 0.4 MG/DL (ref 0–1)
BUN BLDV-MCNC: 38 MG/DL (ref 6–23)
CALCIUM SERPL-MCNC: 9.7 MG/DL (ref 8.3–10.6)
CHLORIDE BLD-SCNC: 98 MMOL/L (ref 99–110)
CO2: 27 MMOL/L (ref 21–32)
CREAT SERPL-MCNC: 1.4 MG/DL (ref 0.9–1.3)
EOSINOPHILS ABSOLUTE: NORMAL /ΜL
EOSINOPHILS RELATIVE PERCENT: NORMAL %
GFR AFRICAN AMERICAN: 58 ML/MIN/1.73M2
GFR NON-AFRICAN AMERICAN: 48 ML/MIN/1.73M2
GLUCOSE BLD-MCNC: 107 MG/DL (ref 70–99)
HCT VFR BLD CALC: 41.3 % (ref 41–53)
HEMOGLOBIN: 13.8 G/DL (ref 13.5–17.5)
LACTATE DEHYDROGENASE: 233 IU/L (ref 120–246)
LYMPHOCYTES ABSOLUTE: NORMAL /ΜL
LYMPHOCYTES RELATIVE PERCENT: NORMAL %
MCH RBC QN AUTO: NORMAL PG
MCHC RBC AUTO-ENTMCNC: NORMAL G/DL
MCV RBC AUTO: NORMAL FL
MONOCYTES ABSOLUTE: NORMAL /ΜL
MONOCYTES RELATIVE PERCENT: NORMAL %
NEUTROPHILS ABSOLUTE: NORMAL /ΜL
NEUTROPHILS RELATIVE PERCENT: NORMAL %
PLATELET # BLD: 162 K/ΜL
PMV BLD AUTO: NORMAL FL
POTASSIUM SERPL-SCNC: 5 MMOL/L (ref 3.5–5.1)
RBC # BLD: 4.43 10^6/ΜL
SODIUM BLD-SCNC: 138 MMOL/L (ref 135–145)
TOTAL PROTEIN: 6.7 GM/DL (ref 6.4–8.2)
WBC # BLD: 6.9 10^3/ML

## 2019-01-04 PROCEDURE — 83615 LACTATE (LD) (LDH) ENZYME: CPT

## 2019-01-04 PROCEDURE — 80053 COMPREHEN METABOLIC PANEL: CPT

## 2019-01-07 ENCOUNTER — HOSPITAL ENCOUNTER (OUTPATIENT)
Age: 84
Discharge: HOME | End: 2019-01-07
Payer: MEDICARE

## 2019-01-07 LAB
INR BLD: 2.15 INDEX
PROTHROMBIN TIME: 24.3 SECONDS (ref 9.12–12.5)

## 2019-01-07 PROCEDURE — 36415 COLL VENOUS BLD VENIPUNCTURE: CPT

## 2019-01-07 PROCEDURE — 85610 PROTHROMBIN TIME: CPT

## 2019-01-08 ENCOUNTER — OFFICE VISIT (OUTPATIENT)
Dept: CARDIOLOGY CLINIC | Age: 84
End: 2019-01-08
Payer: MEDICARE

## 2019-01-08 VITALS
SYSTOLIC BLOOD PRESSURE: 120 MMHG | RESPIRATION RATE: 16 BRPM | BODY MASS INDEX: 23.05 KG/M2 | HEIGHT: 70 IN | DIASTOLIC BLOOD PRESSURE: 68 MMHG | HEART RATE: 60 BPM | WEIGHT: 161 LBS

## 2019-01-08 DIAGNOSIS — I20.8 STABLE ANGINA PECTORIS (HCC): Primary | ICD-10-CM

## 2019-01-08 DIAGNOSIS — I25.10 CORONARY ARTERY DISEASE INVOLVING NATIVE CORONARY ARTERY OF NATIVE HEART WITHOUT ANGINA PECTORIS: ICD-10-CM

## 2019-01-08 DIAGNOSIS — Z95.0 CARDIAC PACEMAKER: ICD-10-CM

## 2019-01-08 DIAGNOSIS — I48.19 PERSISTENT ATRIAL FIBRILLATION (HCC): ICD-10-CM

## 2019-01-08 DIAGNOSIS — I10 ESSENTIAL HYPERTENSION: ICD-10-CM

## 2019-01-08 PROCEDURE — 99214 OFFICE O/P EST MOD 30 MIN: CPT | Performed by: INTERNAL MEDICINE

## 2019-01-08 RX ORDER — PRAVASTATIN SODIUM 40 MG
TABLET ORAL
Qty: 90 TABLET | Refills: 3 | Status: SHIPPED | OUTPATIENT
Start: 2019-01-08 | End: 2020-01-16

## 2019-01-08 RX ORDER — RAMIPRIL 5 MG/1
CAPSULE ORAL
Qty: 90 CAPSULE | Refills: 3 | Status: SHIPPED | OUTPATIENT
Start: 2019-01-08 | End: 2020-01-23

## 2019-01-08 RX ORDER — RANOLAZINE 500 MG/1
500 TABLET, EXTENDED RELEASE ORAL 2 TIMES DAILY
Qty: 60 TABLET | Refills: 3 | Status: SHIPPED | OUTPATIENT
Start: 2019-01-08 | End: 2019-05-12 | Stop reason: SDUPTHER

## 2019-01-08 RX ORDER — ISOSORBIDE MONONITRATE 30 MG/1
TABLET, EXTENDED RELEASE ORAL
Qty: 180 TABLET | Refills: 3 | Status: SHIPPED | OUTPATIENT
Start: 2019-01-08 | End: 2020-03-10

## 2019-01-08 RX ORDER — ATENOLOL 50 MG/1
TABLET ORAL
Qty: 135 TABLET | Refills: 3 | Status: SHIPPED | OUTPATIENT
Start: 2019-01-08 | End: 2020-03-10

## 2019-02-05 ENCOUNTER — HOSPITAL ENCOUNTER (OUTPATIENT)
Age: 84
Discharge: HOME | End: 2019-02-05
Payer: MEDICARE

## 2019-02-05 LAB
INR BLD: 2.43 INDEX
PROTHROMBIN TIME: 27.5 SECONDS (ref 9.12–12.5)

## 2019-02-05 PROCEDURE — 36415 COLL VENOUS BLD VENIPUNCTURE: CPT

## 2019-02-05 PROCEDURE — 85610 PROTHROMBIN TIME: CPT

## 2019-02-17 ENCOUNTER — PROCEDURE VISIT (OUTPATIENT)
Dept: CARDIOLOGY CLINIC | Age: 84
End: 2019-02-17
Payer: MEDICARE

## 2019-02-17 DIAGNOSIS — Z95.0 CARDIAC PACEMAKER IN SITU: ICD-10-CM

## 2019-02-17 DIAGNOSIS — I44.30 AV BLOCK: Primary | ICD-10-CM

## 2019-02-17 PROCEDURE — 93294 REM INTERROG EVL PM/LDLS PM: CPT | Performed by: INTERNAL MEDICINE

## 2019-02-17 PROCEDURE — 93296 REM INTERROG EVL PM/IDS: CPT | Performed by: INTERNAL MEDICINE

## 2019-03-04 ENCOUNTER — HOSPITAL ENCOUNTER (OUTPATIENT)
Age: 84
Discharge: HOME | End: 2019-03-04
Payer: MEDICARE

## 2019-03-04 LAB
INR BLD: 2.28 INDEX
PROTHROMBIN TIME: 25.8 SECONDS (ref 9.12–12.5)

## 2019-03-04 PROCEDURE — 36415 COLL VENOUS BLD VENIPUNCTURE: CPT

## 2019-03-04 PROCEDURE — 85610 PROTHROMBIN TIME: CPT

## 2019-03-29 ENCOUNTER — TELEPHONE (OUTPATIENT)
Dept: CARDIOLOGY CLINIC | Age: 84
End: 2019-03-29

## 2019-04-02 ENCOUNTER — HOSPITAL ENCOUNTER (OUTPATIENT)
Age: 84
Discharge: HOME | End: 2019-04-02
Payer: MEDICARE

## 2019-04-02 LAB
INR BLD: 3 INDEX
PROTHROMBIN TIME: 33.8 SECONDS (ref 9.12–12.5)

## 2019-04-02 PROCEDURE — 36415 COLL VENOUS BLD VENIPUNCTURE: CPT

## 2019-04-02 PROCEDURE — 85610 PROTHROMBIN TIME: CPT

## 2019-05-01 ENCOUNTER — HOSPITAL ENCOUNTER (OUTPATIENT)
Age: 84
Discharge: HOME | End: 2019-05-01
Payer: MEDICARE

## 2019-05-01 LAB
ALBUMIN SERPL-MCNC: 4.6 GM/DL (ref 3.4–5)
ALP BLD-CCNC: 138 IU/L (ref 40–129)
ALT SERPL-CCNC: 21 U/L (ref 10–40)
ANION GAP SERPL CALCULATED.3IONS-SCNC: 11 MMOL/L (ref 4–16)
AST SERPL-CCNC: 30 IU/L (ref 15–37)
BILIRUB SERPL-MCNC: 0.5 MG/DL (ref 0–1)
BUN BLDV-MCNC: 22 MG/DL (ref 6–23)
CALCIUM SERPL-MCNC: 10.1 MG/DL (ref 8.3–10.6)
CHLORIDE BLD-SCNC: 97 MMOL/L (ref 99–110)
CHOLESTEROL, FASTING: 121 MG/DL
CO2: 30 MMOL/L (ref 21–32)
CREAT SERPL-MCNC: 1.1 MG/DL (ref 0.9–1.3)
GFR AFRICAN AMERICAN: >60 ML/MIN/1.73M2
GFR NON-AFRICAN AMERICAN: >60 ML/MIN/1.73M2
GLUCOSE FASTING: 107 MG/DL (ref 70–99)
HDLC SERPL-MCNC: 40 MG/DL
INR BLD: 2.85 INDEX
LDL CHOLESTEROL DIRECT: 68 MG/DL
POTASSIUM SERPL-SCNC: 4.6 MMOL/L (ref 3.5–5.1)
PROTHROMBIN TIME: 32.2 SECONDS (ref 9.12–12.5)
SODIUM BLD-SCNC: 138 MMOL/L (ref 135–145)
TOTAL PROTEIN: 7.3 GM/DL (ref 6.4–8.2)
TRIGLYCERIDE, FASTING: 139 MG/DL
TSH HIGH SENSITIVITY: 4.76 UIU/ML (ref 0.27–4.2)

## 2019-05-01 PROCEDURE — 36415 COLL VENOUS BLD VENIPUNCTURE: CPT

## 2019-05-01 PROCEDURE — 84443 ASSAY THYROID STIM HORMONE: CPT

## 2019-05-01 PROCEDURE — 85610 PROTHROMBIN TIME: CPT

## 2019-05-01 PROCEDURE — 80053 COMPREHEN METABOLIC PANEL: CPT

## 2019-05-01 PROCEDURE — 80061 LIPID PANEL: CPT

## 2019-05-03 ENCOUNTER — HOSPITAL ENCOUNTER (OUTPATIENT)
Age: 84
Setting detail: SPECIMEN
Discharge: HOME | End: 2019-05-03
Payer: MEDICARE

## 2019-05-03 LAB
ALBUMIN SERPL-MCNC: 4.3 GM/DL (ref 3.4–5)
ALP BLD-CCNC: 139 IU/L (ref 40–129)
ALT SERPL-CCNC: 19 U/L (ref 10–40)
ANION GAP SERPL CALCULATED.3IONS-SCNC: 13 MMOL/L (ref 4–16)
AST SERPL-CCNC: 27 IU/L (ref 15–37)
BILIRUB SERPL-MCNC: 0.5 MG/DL (ref 0–1)
BUN BLDV-MCNC: 21 MG/DL (ref 6–23)
CALCIUM SERPL-MCNC: 9.4 MG/DL (ref 8.3–10.6)
CHLORIDE BLD-SCNC: 96 MMOL/L (ref 99–110)
CO2: 28 MMOL/L (ref 21–32)
CREAT SERPL-MCNC: 1 MG/DL (ref 0.9–1.3)
GFR AFRICAN AMERICAN: >60 ML/MIN/1.73M2
GFR NON-AFRICAN AMERICAN: >60 ML/MIN/1.73M2
GLUCOSE BLD-MCNC: 110 MG/DL (ref 70–99)
LACTATE DEHYDROGENASE: 229 IU/L (ref 120–246)
POTASSIUM SERPL-SCNC: 3.9 MMOL/L (ref 3.5–5.1)
SODIUM BLD-SCNC: 137 MMOL/L (ref 135–145)
TOTAL PROTEIN: 6.4 GM/DL (ref 6.4–8.2)

## 2019-05-03 PROCEDURE — 83615 LACTATE (LD) (LDH) ENZYME: CPT

## 2019-05-03 PROCEDURE — 80053 COMPREHEN METABOLIC PANEL: CPT

## 2019-05-13 RX ORDER — RANOLAZINE 500 MG/1
TABLET, EXTENDED RELEASE ORAL
Qty: 60 TABLET | Refills: 5 | Status: SHIPPED | OUTPATIENT
Start: 2019-05-13 | End: 2019-11-22 | Stop reason: SDUPTHER

## 2019-05-22 ENCOUNTER — HOSPITAL ENCOUNTER (OUTPATIENT)
Dept: CT IMAGING | Age: 84
Discharge: HOME | End: 2019-05-22
Payer: MEDICARE

## 2019-05-22 DIAGNOSIS — R63.4 UNINTENTIONAL WEIGHT LOSS: ICD-10-CM

## 2019-05-22 DIAGNOSIS — C82.01 FOLLICULAR LYMPHOMA GRADE I, LYMPH NODES OF HEAD, FACE, AND NECK (HCC): ICD-10-CM

## 2019-05-22 DIAGNOSIS — R06.00 DYSPNEA, UNSPECIFIED TYPE: ICD-10-CM

## 2019-05-22 PROCEDURE — 74176 CT ABD & PELVIS W/O CONTRAST: CPT

## 2019-05-22 PROCEDURE — 71250 CT THORAX DX C-: CPT

## 2019-05-26 ENCOUNTER — PROCEDURE VISIT (OUTPATIENT)
Dept: CARDIOLOGY CLINIC | Age: 84
End: 2019-05-26
Payer: MEDICARE

## 2019-05-26 DIAGNOSIS — Z95.0 CARDIAC PACEMAKER IN SITU: ICD-10-CM

## 2019-05-26 DIAGNOSIS — I44.30 AV BLOCK: Primary | ICD-10-CM

## 2019-05-26 PROCEDURE — 93296 REM INTERROG EVL PM/IDS: CPT | Performed by: INTERNAL MEDICINE

## 2019-05-26 PROCEDURE — 93294 REM INTERROG EVL PM/LDLS PM: CPT | Performed by: INTERNAL MEDICINE

## 2019-06-03 ENCOUNTER — HOSPITAL ENCOUNTER (OUTPATIENT)
Age: 84
Discharge: HOME | End: 2019-06-03
Payer: MEDICARE

## 2019-06-03 LAB
INR BLD: 4.06 INDEX
PROTHROMBIN TIME: 45.6 SECONDS (ref 9.12–12.5)

## 2019-06-03 PROCEDURE — 36415 COLL VENOUS BLD VENIPUNCTURE: CPT

## 2019-06-03 PROCEDURE — 85610 PROTHROMBIN TIME: CPT

## 2019-06-05 ENCOUNTER — HOSPITAL ENCOUNTER (EMERGENCY)
Age: 84
Discharge: ANOTHER HOSPITAL | End: 2019-06-05
Attending: EMERGENCY MEDICINE
Payer: MEDICARE

## 2019-06-05 ENCOUNTER — HOSPITAL ENCOUNTER (INPATIENT)
Age: 84
LOS: 2 days | Discharge: HOME | DRG: 302 | End: 2019-06-07
Attending: INTERNAL MEDICINE | Admitting: INTERNAL MEDICINE
Payer: MEDICARE

## 2019-06-05 ENCOUNTER — APPOINTMENT (OUTPATIENT)
Dept: GENERAL RADIOLOGY | Age: 84
End: 2019-06-05
Payer: MEDICARE

## 2019-06-05 VITALS
WEIGHT: 161 LBS | RESPIRATION RATE: 24 BRPM | SYSTOLIC BLOOD PRESSURE: 85 MMHG | DIASTOLIC BLOOD PRESSURE: 40 MMHG | OXYGEN SATURATION: 93 % | HEIGHT: 70 IN | BODY MASS INDEX: 23.05 KG/M2 | HEART RATE: 50 BPM | TEMPERATURE: 98.5 F

## 2019-06-05 DIAGNOSIS — C81.08 NODULAR LYMPHOCYTE PREDOMINANT HODGKIN LYMPHOMA OF LYMPH NODES OF MULTIPLE REGIONS (HCC): Primary | ICD-10-CM

## 2019-06-05 DIAGNOSIS — C79.51 METASTASIS TO BONE (HCC): ICD-10-CM

## 2019-06-05 DIAGNOSIS — I20.0 UNSTABLE ANGINA (HCC): Primary | ICD-10-CM

## 2019-06-05 DIAGNOSIS — R74.01 TRANSAMINITIS: ICD-10-CM

## 2019-06-05 LAB
ALBUMIN SERPL-MCNC: 4 GM/DL (ref 3.4–5)
ALP BLD-CCNC: 195 IU/L (ref 40–129)
ALT SERPL-CCNC: 448 U/L (ref 10–40)
ANION GAP SERPL CALCULATED.3IONS-SCNC: 19 MMOL/L (ref 4–16)
APTT: 33.8 SECONDS (ref 24–40)
AST SERPL-CCNC: 555 IU/L (ref 15–37)
BASOPHILS ABSOLUTE: 0 K/CU MM
BASOPHILS RELATIVE PERCENT: 0.1 % (ref 0–1)
BILIRUB SERPL-MCNC: 0.8 MG/DL (ref 0–1)
BUN BLDV-MCNC: 30 MG/DL (ref 6–23)
CALCIUM SERPL-MCNC: 9.6 MG/DL (ref 8.3–10.6)
CHLORIDE BLD-SCNC: 100 MMOL/L (ref 99–110)
CO2: 20 MMOL/L (ref 21–32)
CREAT SERPL-MCNC: 1 MG/DL (ref 0.9–1.3)
DIFFERENTIAL TYPE: ABNORMAL
EOSINOPHILS ABSOLUTE: 0 K/CU MM
EOSINOPHILS RELATIVE PERCENT: 0.3 % (ref 0–3)
GFR AFRICAN AMERICAN: >60 ML/MIN/1.73M2
GFR NON-AFRICAN AMERICAN: >60 ML/MIN/1.73M2
GLUCOSE BLD-MCNC: 96 MG/DL (ref 70–99)
HCT VFR BLD CALC: 47 % (ref 42–52)
HEMOGLOBIN: 14.7 GM/DL (ref 13.5–18)
IMMATURE NEUTROPHIL %: 0.4 % (ref 0–0.43)
INR BLD: 3.84 INDEX
LV EF: 53 %
LVEF MODALITY: NORMAL
LYMPHOCYTES ABSOLUTE: 0.3 K/CU MM
LYMPHOCYTES RELATIVE PERCENT: 3.6 % (ref 24–44)
MCH RBC QN AUTO: 30.5 PG (ref 27–31)
MCHC RBC AUTO-ENTMCNC: 31.3 % (ref 32–36)
MCV RBC AUTO: 97.5 FL (ref 78–100)
MONOCYTES ABSOLUTE: 0.1 K/CU MM
MONOCYTES RELATIVE PERCENT: 0.9 % (ref 0–4)
PDW BLD-RTO: 13.9 % (ref 11.7–14.9)
PLATELET # BLD: 127 K/CU MM (ref 140–440)
PMV BLD AUTO: 9.6 FL (ref 7.5–11.1)
POTASSIUM SERPL-SCNC: 4 MMOL/L (ref 3.5–5.1)
PROTHROMBIN TIME: 43.2 SECONDS (ref 9.12–12.5)
RBC # BLD: 4.82 M/CU MM (ref 4.6–6.2)
SEGMENTED NEUTROPHILS ABSOLUTE COUNT: 8.6 K/CU MM
SEGMENTED NEUTROPHILS RELATIVE PERCENT: 94.7 % (ref 36–66)
SODIUM BLD-SCNC: 139 MMOL/L (ref 135–145)
TOTAL IMMATURE NEUTOROPHIL: 0.04 K/CU MM
TOTAL PROTEIN: 6.6 GM/DL (ref 6.4–8.2)
TROPONIN T: 0.02 NG/ML
TROPONIN T: <0.01 NG/ML
TROPONIN T: <0.01 NG/ML
WBC # BLD: 9.1 K/CU MM (ref 4–10.5)

## 2019-06-05 PROCEDURE — 85610 PROTHROMBIN TIME: CPT

## 2019-06-05 PROCEDURE — 2140000000 HC CCU INTERMEDIATE R&B

## 2019-06-05 PROCEDURE — 84484 ASSAY OF TROPONIN QUANT: CPT

## 2019-06-05 PROCEDURE — 85730 THROMBOPLASTIN TIME PARTIAL: CPT

## 2019-06-05 PROCEDURE — 85025 COMPLETE CBC W/AUTO DIFF WBC: CPT

## 2019-06-05 PROCEDURE — 93005 ELECTROCARDIOGRAM TRACING: CPT | Performed by: EMERGENCY MEDICINE

## 2019-06-05 PROCEDURE — 99222 1ST HOSP IP/OBS MODERATE 55: CPT | Performed by: INTERNAL MEDICINE

## 2019-06-05 PROCEDURE — 80053 COMPREHEN METABOLIC PANEL: CPT

## 2019-06-05 PROCEDURE — 71045 X-RAY EXAM CHEST 1 VIEW: CPT

## 2019-06-05 PROCEDURE — 6360000002 HC RX W HCPCS: Performed by: INTERNAL MEDICINE

## 2019-06-05 PROCEDURE — 2580000003 HC RX 258: Performed by: INTERNAL MEDICINE

## 2019-06-05 PROCEDURE — 99285 EMERGENCY DEPT VISIT HI MDM: CPT

## 2019-06-05 PROCEDURE — 6370000000 HC RX 637 (ALT 250 FOR IP): Performed by: INTERNAL MEDICINE

## 2019-06-05 PROCEDURE — 36415 COLL VENOUS BLD VENIPUNCTURE: CPT

## 2019-06-05 PROCEDURE — 2580000003 HC RX 258: Performed by: EMERGENCY MEDICINE

## 2019-06-05 PROCEDURE — 93306 TTE W/DOPPLER COMPLETE: CPT

## 2019-06-05 RX ORDER — SODIUM CHLORIDE 0.9 % (FLUSH) 0.9 %
10 SYRINGE (ML) INJECTION PRN
Status: DISCONTINUED | OUTPATIENT
Start: 2019-06-05 | End: 2019-06-05 | Stop reason: HOSPADM

## 2019-06-05 RX ORDER — PRAVASTATIN SODIUM 40 MG
40 TABLET ORAL DAILY
Status: DISCONTINUED | OUTPATIENT
Start: 2019-06-05 | End: 2019-06-07 | Stop reason: HOSPADM

## 2019-06-05 RX ORDER — 0.9 % SODIUM CHLORIDE 0.9 %
1000 INTRAVENOUS SOLUTION INTRAVENOUS ONCE
Status: COMPLETED | OUTPATIENT
Start: 2019-06-05 | End: 2019-06-05

## 2019-06-05 RX ORDER — HYDROCHLOROTHIAZIDE 12.5 MG/1
12.5 CAPSULE, GELATIN COATED ORAL EVERY MORNING
COMMUNITY
Start: 2019-04-26

## 2019-06-05 RX ORDER — HYDROCHLOROTHIAZIDE 12.5 MG/1
12.5 TABLET ORAL DAILY
Status: DISCONTINUED | OUTPATIENT
Start: 2019-06-05 | End: 2019-06-07 | Stop reason: HOSPADM

## 2019-06-05 RX ORDER — RANOLAZINE 500 MG/1
500 TABLET, EXTENDED RELEASE ORAL 2 TIMES DAILY
Status: DISCONTINUED | OUTPATIENT
Start: 2019-06-05 | End: 2019-06-07 | Stop reason: HOSPADM

## 2019-06-05 RX ORDER — ISOSORBIDE MONONITRATE 30 MG/1
30 TABLET, EXTENDED RELEASE ORAL DAILY
Status: DISCONTINUED | OUTPATIENT
Start: 2019-06-05 | End: 2019-06-07 | Stop reason: HOSPADM

## 2019-06-05 RX ORDER — ASPIRIN 81 MG/1
81 TABLET, CHEWABLE ORAL DAILY
Status: DISCONTINUED | OUTPATIENT
Start: 2019-06-06 | End: 2019-06-05 | Stop reason: SDUPTHER

## 2019-06-05 RX ORDER — ASPIRIN 81 MG/1
81 TABLET ORAL DAILY
Status: DISCONTINUED | OUTPATIENT
Start: 2019-06-06 | End: 2019-06-07 | Stop reason: HOSPADM

## 2019-06-05 RX ORDER — 0.9 % SODIUM CHLORIDE 0.9 %
500 INTRAVENOUS SOLUTION INTRAVENOUS ONCE
Status: COMPLETED | OUTPATIENT
Start: 2019-06-05 | End: 2019-06-05

## 2019-06-05 RX ORDER — RAMIPRIL 2.5 MG/1
5 CAPSULE ORAL DAILY
Status: DISCONTINUED | OUTPATIENT
Start: 2019-06-05 | End: 2019-06-07 | Stop reason: HOSPADM

## 2019-06-05 RX ORDER — TRIAMCINOLONE ACETONIDE 1 MG/G
CREAM TOPICAL
COMMUNITY
Start: 2019-04-16 | End: 2019-07-23

## 2019-06-05 RX ORDER — ATENOLOL 25 MG/1
25 TABLET ORAL DAILY
Status: DISCONTINUED | OUTPATIENT
Start: 2019-06-05 | End: 2019-06-07 | Stop reason: HOSPADM

## 2019-06-05 RX ORDER — NITROGLYCERIN 0.4 MG/1
0.4 TABLET SUBLINGUAL EVERY 5 MIN PRN
Status: DISCONTINUED | OUTPATIENT
Start: 2019-06-05 | End: 2019-06-07 | Stop reason: HOSPADM

## 2019-06-05 RX ORDER — DOPAMINE HYDROCHLORIDE 160 MG/100ML
INJECTION, SOLUTION INTRAVENOUS
Status: DISPENSED
Start: 2019-06-05 | End: 2019-06-06

## 2019-06-05 RX ORDER — ONDANSETRON 2 MG/ML
4 INJECTION INTRAMUSCULAR; INTRAVENOUS EVERY 6 HOURS PRN
Status: DISCONTINUED | OUTPATIENT
Start: 2019-06-05 | End: 2019-06-07 | Stop reason: HOSPADM

## 2019-06-05 RX ORDER — SODIUM CHLORIDE 0.9 % (FLUSH) 0.9 %
10 SYRINGE (ML) INJECTION EVERY 12 HOURS SCHEDULED
Status: DISCONTINUED | OUTPATIENT
Start: 2019-06-05 | End: 2019-06-07 | Stop reason: HOSPADM

## 2019-06-05 RX ORDER — KETOROLAC TROMETHAMINE 30 MG/ML
15 INJECTION, SOLUTION INTRAMUSCULAR; INTRAVENOUS EVERY 6 HOURS PRN
Status: DISCONTINUED | OUTPATIENT
Start: 2019-06-05 | End: 2019-06-07 | Stop reason: HOSPADM

## 2019-06-05 RX ORDER — SODIUM CHLORIDE 0.9 % (FLUSH) 0.9 %
10 SYRINGE (ML) INJECTION PRN
Status: DISCONTINUED | OUTPATIENT
Start: 2019-06-05 | End: 2019-06-07 | Stop reason: HOSPADM

## 2019-06-05 RX ORDER — DOPAMINE HYDROCHLORIDE 160 MG/100ML
2.5 INJECTION, SOLUTION INTRAVENOUS CONTINUOUS
Status: DISCONTINUED | OUTPATIENT
Start: 2019-06-05 | End: 2019-06-07 | Stop reason: HOSPADM

## 2019-06-05 RX ADMIN — ENOXAPARIN SODIUM 30 MG: 30 INJECTION SUBCUTANEOUS at 20:04

## 2019-06-05 RX ADMIN — SODIUM CHLORIDE 500 ML: 9 INJECTION, SOLUTION INTRAVENOUS at 13:29

## 2019-06-05 RX ADMIN — SODIUM CHLORIDE, PRESERVATIVE FREE 10 ML: 5 INJECTION INTRAVENOUS at 08:05

## 2019-06-05 RX ADMIN — SODIUM CHLORIDE, PRESERVATIVE FREE 10 ML: 5 INJECTION INTRAVENOUS at 20:04

## 2019-06-05 RX ADMIN — RANOLAZINE 500 MG: 500 TABLET, FILM COATED, EXTENDED RELEASE ORAL at 20:04

## 2019-06-05 RX ADMIN — SODIUM CHLORIDE 1000 ML: 9 INJECTION, SOLUTION INTRAVENOUS at 14:41

## 2019-06-05 RX ADMIN — DOPAMINE HYDROCHLORIDE IN DEXTROSE 2.5 MCG/KG/MIN: 1.6 INJECTION, SOLUTION INTRAVENOUS at 14:26

## 2019-06-05 RX ADMIN — PRAVASTATIN SODIUM 40 MG: 40 TABLET ORAL at 20:04

## 2019-06-05 RX ADMIN — KETOROLAC TROMETHAMINE 15 MG: 30 INJECTION, SOLUTION INTRAMUSCULAR; INTRAVENOUS at 14:29

## 2019-06-05 RX ADMIN — KETOROLAC TROMETHAMINE 15 MG: 30 INJECTION, SOLUTION INTRAMUSCULAR; INTRAVENOUS at 21:16

## 2019-06-05 ASSESSMENT — PAIN SCALES - GENERAL
PAINLEVEL_OUTOF10: 5
PAINLEVEL_OUTOF10: 7
PAINLEVEL_OUTOF10: 1
PAINLEVEL_OUTOF10: 0

## 2019-06-05 NOTE — PROGRESS NOTES
pt last two BP 91/51 map 69 & 88/46 map 60. Pt is asymptomatic. Dr. Renuka Diaz notified via perfect serve.

## 2019-06-05 NOTE — ED PROVIDER NOTES
Emergency Department Encounter    Patient: Garth Sevilla  MRN: [id number]  : 3/5/1929  Date of Evaluation: 2019  ED Provider:  Giles Wood    Triage Chief Complaint:   Chest Pain (arrives via EMS from home; pt awakened at 0200 with substernal CP; pain was 4/10 at home; 324mg asa and nitro x 1 per EMS PTA; pain 0/10 upon arrival to ED)    Elem:  Garth Sevilla is a 80 y.o. male that presents with complaint of chest pain. Started around 2 AM, has been continuous, pressure pain over substernal area to the left side of the chest, does not radiate elsewhere. Was a 4 out of 10. He took nitro at home without any relief. Has not been able to get comfortable or sleep. No change with inspiration. No shortness of breath. No nausea or vomiting. No leg swelling or pain. No history of blood clots. He has a history of CAD with frequent angina, hypertension, hyperlipidemia and paroxysmal atrial fibrillation- on coumadin. He does have a pacemaker. He sees Dr. Enmanuel Zuñiga with cardiology. He reports he will typically get chest pain like this when he exerts himself and when he takes a nitro will go away. He has had to increase his use of nitro recently. Has not occurred at rest like this and has never not gone away with the first nitro like it did this morning. EMS did give him a second nitro on the way here and it went from a 4 to a 0. He is not currently experiencing any pain. Had not been ill recently, no fevers or cough or vomiting or diarrhea. He denies other complaints. On review of the chart appears that he was having similar symptoms in October but just associated with exertion and she is explained to me. He had been started on Ranexa by his cardiologist with improvement of symptoms and was not using the nitro as much at the beginning of the year. Now having to use it more often again. He does have a history of lymphoma now with recurrence.  Last nuclear stress test was 2017, has had History     Socioeconomic History    Marital status:      Spouse name: naty    Number of children: 2    Years of education: Not on file    Highest education level: Not on file   Occupational History    Occupation: retired   Social Needs    Financial resource strain: Not on file    Food insecurity:     Worry: Not on file     Inability: Not on file   Vol[employer]er[employer] needs:     Medical: Not on file     Non-medical: Not on file   Tobacco Use    Smoking status: Never Smoker    Smokeless tobacco: Never Used   Substance and Sexual Activity    Alcohol use: No     Comment: DID IN COLLEGE    Drug use: No    Sexual activity: Not on file   Lifestyle    Physical activity:     Days per week: Not on file     Minutes per session: Not on file    Stress: Not on file   Relationships    Social connections:     Talks on phone: Not on file     Gets together: Not on file     Attends [employer] service: Not on file     Active member of club or organization: Not on file     Attends meetings of clubs or organizations: Not on file     Relationship status: Not on file    Intimate partner violence:     Fear of current or ex partner: Not on file     Emotionally abused: Not on file     Physically abused: Not on file     Forced sexual activity: Not on file   Other Topics Concern    Not on file   Social History Narrative    Not on file     Current Facility-Administered Medications   Medication Dose Route Frequency Provider Last Rate Last Dose    sodium chloride flush 0.9 % injection 10 mL  10 mL Intravenous PRN Sharon Avila MD   10 mL at 06/05/19 0805     Current Outpatient Medications   Medication Sig Dispense Refill    hydrochlorothiazide (MICROZIDE) 12.5 MG capsule       triamcinolone (KENALOG) 0.1 % cream       ranolazine (RANEXA) 500 MG extended release tablet TAKE ONE TABLET BY MOUTH TWICE A DAY 60 tablet 5    pravastatin (PRAVACHOL) 40 MG tablet TAKE ONE TABLET BY MOUTH EVERY DAY 90 tablet 3    ramipril (ALTACE) 5 MG capsule TAKE 1 CAPSULE DAILY 90 capsule 3    atenolol (TENORMIN) 50 MG tablet TAKE ONE AND ONE-HALF (1 & 1/2) TABLET BY MOUTH DAILY 135 tablet 3    isosorbide mononitrate (IMDUR) 30 MG extended release tablet TAKE TWO TABLETS BY MOUTH DAILY 180 tablet 3    warfarin (COUMADIN) 5 MG tablet As directed per Dr. Alvina Lambert (Dr. Alvina Lambert monitors PT/INR).  nitroGLYCERIN (NITROSTAT) 0.4 MG SL tablet Place 1 tablet under the tongue every 5 minutes as needed. 25 tablet 2    Multiple Vitamin (MULTI-VITAMIN PO) Take 1 tablet by mouth daily.  loratadine (CLARITIN) 10 MG tablet Take 10 mg by mouth daily.  aspirin 81 MG EC tablet Take 81 mg by mouth daily.  omeprazole (PRILOSEC) 20 MG capsule Take 20 mg by mouth daily. Allergies   Allergen Reactions    Pcn [Penicillins] Diarrhea    Zocor [Simvastatin - High Dose]        Nursing Notes Reviewed    Physical Exam:  ED Triage Vitals [06/05/19 0755]   Enc Vitals Group      BP (!) 114/54      Pulse 65      Resp 22      Temp 98.5 °F (36.9 °C)      Temp Source Oral      SpO2 95 %      Weight       Height       Head Circumference       Peak Flow       Pain Score       Pain Loc       Pain Edu? Excl. in [address]? My pulse ox interpretation is - normal    General appearance:  No acute distress. Skin:  Warm. Dry. Eye:  Extraocular movements intact. Ears, nose, mouth and throat:  Oral mucosa moist   Neck:  Trachea midline. Extremiy:  No swelling. Normal ROM     Heart: Regular rate and rhythm, normal S1 & S2, no extra heart sounds. Perfusion:  intact  Respiratory:  Lungs clear to auscultation bilaterally. Respirations nonlabored. Abdominal:  Normal bowel sounds. Soft. Nontender. Non distended.   Neurological:  Alert and oriented            Psychiatric:  Appropriate    I have reviewed and interpreted all of the currently available lab results from this visit (if applicable):  Results for orders placed or performed during the hospital encounter of 06/05/19   CBC Auto Differential   Result Value Ref Range    WBC 9.1 4.0 - 10.5 K/CU MM    RBC 4.82 4.6 - 6.2 M/CU MM    Hemoglobin 14.7 13.5 - 18.0 GM/DL    Hematocrit 47.0 42 - 52 %    MCV 97.5 78 - 100 FL    MCH 30.5 27 - 31 PG    MCHC 31.3 (L) 32.0 - 36.0 %    RDW 13.9 11.7 - 14.9 %    Platelets 196 (L) 598 - 440 K/CU MM    MPV 9.6 7.5 - 11.1 FL    Differential Type AUTOMATED DIFFERENTIAL     Segs Relative 94.7 (H) 36 - 66 %    Lymphocytes % 3.6 (L) 24 - 44 %    Monocytes % 0.9 0 - 4 %    Eosinophils % 0.3 0 - 3 %    Basophils % 0.1 0 - 1 %    Segs Absolute 8.6 K/CU MM    Lymphocytes # 0.3 K/CU MM    Monocytes # 0.1 K/CU MM    Eosinophils # 0.0 K/CU MM    Basophils # 0.0 K/CU MM    Immature Neutrophil % 0.4 0 - 0.43 %    Total Immature Neutrophil 0.04 K/CU MM   CMP   Result Value Ref Range    Sodium 139 135 - 145 MMOL/L    Potassium 4.0 3.5 - 5.1 MMOL/L    Chloride 100 99 - 110 mMol/L    CO2 20 (L) 21 - 32 MMOL/L    BUN 30 (H) 6 - 23 MG/DL    CREATININE 1.0 0.9 - 1.3 MG/DL    Glucose 96 70 - 99 MG/DL    Calcium 9.6 8.3 - 10.6 MG/DL    Alb 4.0 3.4 - 5.0 GM/DL    Total Protein 6.6 6.4 - 8.2 GM/DL    Total Bilirubin 0.8 0.0 - 1.0 MG/DL     (H) 10 - 40 U/L     (H) 15 - 37 IU/L    Alkaline Phosphatase 195 (H) 40 - 129 IU/L    GFR Non-African American >60 >60 mL/min/1.73m2    GFR African American >60 >60 mL/min/1.73m2    Anion Gap 19 (H) 4 - 16   Troponin   Result Value Ref Range    Troponin T <0.010 <0.01 NG/ML   Protime/INR & PTT   Result Value Ref Range    Protime 43.2 (H) 9.12 - 12.5 SECONDS    INR 3.84 INDEX    aPTT 33.8 24 - 40 SECONDS   EKG 12 Lead   Result Value Ref Range    Ventricular Rate 68 BPM    Atrial Rate 241 BPM    QRS Duration 180 ms    Q-T Interval 444 ms    QTc Calculation (Bazett) 472 ms    R Axis -67 degrees    T Axis 100 degrees    Diagnosis       Ventricular-paced rhythm  Abnormal ECG  No previous ECGs available        Radiographs (if obtained):    [] Radiologist's Report Reviewed:  XR CHEST PORTABLE   Preliminary Result   No acute cardiopulmonary process. Refer to chest CT imaging 05/22/2019. EKG (if obtained): (All EKG's are interpreted by myself in the absence of a cardiologist)  Ventricular paced rhythm with rate of 68 bpm, compared to previous from February 19, 2016 he is no longer AV dual paced. Chart review shows recent radiographs:  Ct Abdomen Pelvis Wo Contrast Additional Contrast? None    Result Date: 5/23/2019  EXAMINATION: CT OF THE CHEST WITHOUT CONTRAST; CT OF THE ABDOMEN AND PELVIS WITHOUT CONTRAST 5/22/2019 10:25 am TECHNIQUE: CT of the chest was performed without the administration of intravenous contrast. Multiplanar reformatted images are provided for review. Dose modulation, iterative reconstruction, and/or weight based adjustment of the mA/kV was utilized to reduce the radiation dose to as low as reasonably achievable.; CT of the abdomen and pelvis was performed without the administration of intravenous contrast. Multiplanar reformatted images are provided for review. Dose modulation, iterative reconstruction, and/or weight based adjustment of the mA/kV was utilized to reduce the radiation dose to as low as reasonably achievable. COMPARISON: October 10, 2018. May 14, 2018. HISTORY: ORDERING SYSTEM PROVIDED HISTORY: Follicular lymphoma grade i, lymph nodes of head, face, and neck (HCC) FINDINGS: Chest: Mediastinum: Left chest wall pacer in place. The heart is enlarged with severe coronary artery atherosclerosis. No pericardial effusion. The ascending aorta measures up to 4.5 cm, stable. The transverse and descending thoracic aorta are normal in course and caliber. Severe atherosclerosis of the thoracic aorta is present. No pathologically enlarged mediastinal or hilar nodes. Lungs/pleura: The lungs demonstrate no focal consolidation or pleural effusion. Basilar atelectasis is seen.   No suspicious pulmonary nodules. Central airways are patent. No bronchial wall thickening or bronchiectasis. Soft Tissues/Bones: Redemonstration of the enlarged left axillary/subpectoral lymph node, measuring 5.9 x 3.5 cm, previously 7.1 x 4.4 cm. The 2nd index lymph node in the axilla has decreased in size, measuring 13 x 8 mm (series 2, image 51), previously 17 x 15 mm. No right axillary lymphadenopathy. No suspicious lymph nodes identified in the lower neck. Interval appearance of subtle lucent lesions involving the thoracic spine, most compatible with metastatic disease. Findings are most significant at T5 with a pathologic fracture. There is 3 mm of bony protrusion into the spinal canal seen. Additionally, superior endplate deformity of P71 is present. Abdomen/Pelvis: Organs: Stable appearance of the patient's right hepatic lobe liver hemangioma which is seen to better advantage on the prior CT which included IV contrast.  No suspicious hepatic masses. Status post cholecystectomy. No intra or extrahepatic biliary dilatation. The spleen, right adrenal gland, and bilateral kidneys demonstrate no acute abnormality. Redemonstration of a right renal cyst, measuring up to 3.0 cm. Bilateral ureters are normal in course and caliber. Redemonstration of an infiltrating mass identified in the left upper quadrant. The mass is not as well delineated secondary to lack of IV contrast.  Encasement of several vessels is again identified, not significantly changed. Additionally, the mass encases the pancreatic body and tail and left adrenal gland. Overall, the conglomerate measures 5.8 x 4.7 cm, previously 7.5 x 5.4 cm. GI/Bowel: The stomach, small bowel, and colon are normal in course and caliber without evidence of wall thickening or obstruction. Severe diverticulosis. Evidence of prior small bowel resection is seen. The anastomosis is unremarkable. Small ventral hernia containing small bowel is also present.  No evidence of obstruction. Pelvis: Normal bladder. Prostate and seminal vesicles are unremarkable. Peritoneum/Retroperitoneum: No free fluid or free air. Aorta and its branches are normal in course and caliber with severe atherosclerosis. Soft tissue mass in the left upper quadrant is described above. No new lymphadenopathy. Bones/Soft Tissues: Interval appearance of lucency involving several lumbar vertebral bodies, most significant at L5 with pathologic fracture. No bony protrusion into the spinal canal is seen. No discrete lesions of the pelvis are identified. 1. Interval appearance of multiple osseous metastases in the thoracic and lumbar spine. Compression fractures involving T5 and L5 are present. Superior endplate deformity of Z78 is also seen. 2. Interval decrease in size of left axillary lymphadenopathy and infiltrating mass in the left upper quadrant, compatible with response to treatment. 3. Stable appearance of the ascending aorta which measures up to 4.5 cm. 4. Severe diverticulosis. 5. Small ventral hernia containing small bowel. No evidence of obstruction. Ct Chest Wo Contrast    Result Date: 5/23/2019  EXAMINATION: CT OF THE CHEST WITHOUT CONTRAST; CT OF THE ABDOMEN AND PELVIS WITHOUT CONTRAST 5/22/2019 10:25 am TECHNIQUE: CT of the chest was performed without the administration of intravenous contrast. Multiplanar reformatted images are provided for review. Dose modulation, iterative reconstruction, and/or weight based adjustment of the mA/kV was utilized to reduce the radiation dose to as low as reasonably achievable.; CT of the abdomen and pelvis was performed without the administration of intravenous contrast. Multiplanar reformatted images are provided for review. Dose modulation, iterative reconstruction, and/or weight based adjustment of the mA/kV was utilized to reduce the radiation dose to as low as reasonably achievable. COMPARISON: October 10, 2018. May 14, 2018.  HISTORY: ORDERING SYSTEM PROVIDED HISTORY: Follicular lymphoma grade i, lymph nodes of head, face, and neck (HCC) FINDINGS: Chest: Mediastinum: Left chest wall pacer in place. The heart is enlarged with severe coronary artery atherosclerosis. No pericardial effusion. The ascending aorta measures up to 4.5 cm, stable. The transverse and descending thoracic aorta are normal in course and caliber. Severe atherosclerosis of the thoracic aorta is present. No pathologically enlarged mediastinal or hilar nodes. Lungs/pleura: The lungs demonstrate no focal consolidation or pleural effusion. Basilar atelectasis is seen. No suspicious pulmonary nodules. Central airways are patent. No bronchial wall thickening or bronchiectasis. Soft Tissues/Bones: Redemonstration of the enlarged left axillary/subpectoral lymph node, measuring 5.9 x 3.5 cm, previously 7.1 x 4.4 cm. The 2nd index lymph node in the axilla has decreased in size, measuring 13 x 8 mm (series 2, image 51), previously 17 x 15 mm. No right axillary lymphadenopathy. No suspicious lymph nodes identified in the lower neck. Interval appearance of subtle lucent lesions involving the thoracic spine, most compatible with metastatic disease. Findings are most significant at T5 with a pathologic fracture. There is 3 mm of bony protrusion into the spinal canal seen. Additionally, superior endplate deformity of E16 is present. Abdomen/Pelvis: Organs: Stable appearance of the patient's right hepatic lobe liver hemangioma which is seen to better advantage on the prior CT which included IV contrast.  No suspicious hepatic masses. Status post cholecystectomy. No intra or extrahepatic biliary dilatation. The spleen, right adrenal gland, and bilateral kidneys demonstrate no acute abnormality. Redemonstration of a right renal cyst, measuring up to 3.0 cm. Bilateral ureters are normal in course and caliber. Redemonstration of an infiltrating mass identified in the left upper quadrant.   The mass is not as well delineated secondary to lack of IV contrast.  Encasement of several vessels is again identified, not significantly changed. Additionally, the mass encases the pancreatic body and tail and left adrenal gland. Overall, the conglomerate measures 5.8 x 4.7 cm, previously 7.5 x 5.4 cm. GI/Bowel: The stomach, small bowel, and colon are normal in course and caliber without evidence of wall thickening or obstruction. Severe diverticulosis. Evidence of prior small bowel resection is seen. The anastomosis is unremarkable. Small ventral hernia containing small bowel is also present. No evidence of obstruction. Pelvis: Normal bladder. Prostate and seminal vesicles are unremarkable. Peritoneum/Retroperitoneum: No free fluid or free air. Aorta and its branches are normal in course and caliber with severe atherosclerosis. Soft tissue mass in the left upper quadrant is described above. No new lymphadenopathy. Bones/Soft Tissues: Interval appearance of lucency involving several lumbar vertebral bodies, most significant at L5 with pathologic fracture. No bony protrusion into the spinal canal is seen. No discrete lesions of the pelvis are identified. 1. Interval appearance of multiple osseous metastases in the thoracic and lumbar spine. Compression fractures involving T5 and L5 are present. Superior endplate deformity of K93 is also seen. 2. Interval decrease in size of left axillary lymphadenopathy and infiltrating mass in the left upper quadrant, compatible with response to treatment. 3. Stable appearance of the ascending aorta which measures up to 4.5 cm. 4. Severe diverticulosis. 5. Small ventral hernia containing small bowel. No evidence of obstruction. MDM:  5year-old male with history as above presents with concern for chest pain, has completely resolved after second nitro was given by EMS.   His symptoms are concerning for possible unstable angina given the progression over the last weeks, from just with exertion and now at rest, and having to use nitro more often at home over the last several weeks. He is already on Ranexa. I low suspicion for PE, he is on Coumadin and I have ordered an INR to ensure that this is therapeutic. Basic labs including troponin. Patient currently chest pain-free and in a V paced rhythm. Patient's labs show that he has a therapeutic INR of 3.8, troponin is normal, hemoglobin is stable. His liver enzymes are elevated- unclear etiology of this as he does not have any abdominal pain, vomiting or diarrhea, no tenderness on my exam.  White count is normal.  Kidney function is normal.  Given the symptoms she arrived with today and my concern for possible unstable angina I did consult his cardiology team.     Spoke with Dr. Zenaida Wells, he agrees that patient would need to be seen where he would be able to potentially have further evaluation with stress or cardiac cath, we will plan to transfer him to [facility] [place] for admission to the hospitalist team there for evaluation of what sounds like unstable angina. Did advise the patient if he develops any abdominal pain, nausea or vomiting we also would need to further evaluate his transaminitis. He and wife are agreeable to plan for transfer. Hospitalist consult has been placed. Clinical Impression:  1. Unstable angina ([facility] [address]    2. Transaminitis      Disposition referral (if applicable):  No follow-up provider specified. Disposition medications (if applicable):  New Prescriptions    No medications on file       Comment: Please note this report has been produced using speech recognition software and may contain errors related to that system including errors in grammar, punctuation, and spelling, as well as words and phrases that may be inappropriate. Efforts were made to edit the dictations. Casey Magana MD  06/05/19 7345        Patient remains asymptomatic and chest pain free at this time.  Hospitalist from [facility] Dr. Elyse Ramirez has accepted patient in transfer.       Joaquín Gurrola MD  06/05/19 0821

## 2019-06-05 NOTE — H&P
[facility]     History and Physical      Name:  Yue Peralta /Age/Sex: 3/5/1929  (80 y.o. male)   MRN & CSN:  [id number] & [id number] Admission Date/Time: 2019 11:41 AM   Location:  [id number] PCP: Soledad Hanley MD       Hospital Day: 1    Assessment and Plan:   Yue Peralta is a 80 y.o.  male with past medical history of hypertension, CAD, history of complete heart block status post cardiac pacemaker, cardiac arrhythmia who was sent from [facility] ED for chest pain    · Chest pain r/o ACS -  characterized as pressure like, associated symptoms include SOB, ECG showing paced rhythm, first troponin negative, will repeat troponin X2, check Lipid panel, A1c in am, start on ASA, Nitro PRN, Lipitor, BB, cardiology was consulted but patient declined any cardiology workup after arriving to the hospital     · Possible cardiogenic shock -BP was 75/45- given fluid bolus and started on dopamine drip- patient doesn't want any invasive procedures, will continue conservative medical management    · CAD - continue with aspirin Plavix, ranexa and may start Lopressor as pressure allows    · Hypertension- - hold any BP medications for now     Other Chronic comorbidity conditions being addressed include:  Complete Heart block S/p Pacer  HLD  Hard of hearing     Diet DIET CARDIAC; No Caffeine   DVT Prophylaxis [] Lovenox, []  Heparin, [] SCDs, [] No VTE prophylaxis, patient ambulating   GI Prophylaxis [] PPI, [] H2 Blocker, [] No GI prophylaxis, patient is receiving diet/Tube Feeds   Code Status Full Code   Disposition Patient requires continued admission due to Chest pain r/o ACS   MDM [] Low, [] Moderate,[x]  High  Patient's risk as above due to possible ACS     History of Present Illness:     Chief Complaint: Chest pain    Yue Peralta is a 80 y.o.  male  with past medical history of hypertension, CAD, history of complete heart block status post cardiac pacemaker, cardiac arrhythmia who was sent from [facility] ED for chest pain. Patient states that his chest pain started last night. It's a pressure-like substernal nonradiating chest pain. There is associated shortness of breath. Denies any nausea or vomiting. He has no cough. No diaphoresis. His no syncopal feeling. Ten point ROS reviewed negative, unless as noted above    Objective:   No intake or output data in the 24 hours ending 06/05/19 1152   Vitals: There were no vitals filed for this visit. Physical Exam:    GEN Awake male resting in bed in no apparent distress. Appears given age. EYES Pupils are equally round. No scleral erythema, discharge, or conjunctivitis. HENT Mucous membranes are moist.   NECK No apparent thyromegaly or masses. RESP Clear to auscultation, no wheezes, rales or rhonchi. Symmetric chest movement while on room air. CARDIO/VASC S1/S2 auscultated. Regular rate without appreciable murmurs, rubs, or gallops. Peripheral pulses equal bilaterally and palpable. No peripheral edema. GI Abdomen is soft without significant tenderness, masses, or guarding. Bowel sounds are normoactive. Rectal exam deferred.  Whitten catheter is not present. HEME/LYMPH No petechiae or ecchymoses. MSK No gross joint deformities. Spontaneous movement of all extremities  SKIN Normal coloration, warm, dry. NEURO Cranial nerves appear grossly intact, normal speech, no lateralizing weakness. PSYCH Awake, alert, oriented x 4. Affect appropriate.     Past Medical History:      Past Medical History:   Diagnosis Date    CAD (coronary artery disease)     History of PTCA -LAD 1992    Cancer ([facility] [address]     small intestine    Cardiac pacemaker     CHB (complete heart block) ([facility] [address] [facility]     H/O cardiac catheterization 11/15/1993    Lm patent, LAD 90 eccentric plaque like stenosis immediately after the large diag branch, cx patent, Rca dominant, LV normal in size and shape with small apical dyskinesis  ef 66    H/O cardiovascular stress test 2/28/17, 2/14/2012    inferior wall moderate fixed perfusion defect without wall motion abnormality suggestive of diaphragmatio artifact vs. old inferior MI. normal lv size and systolic function. EF 63%    H/O chest x-ray 12/27/2011    nonacute, copd    H/O echocardiogram 5/4/2004    aortic root dilation, moderate aortic regurg, normal LV size and fxn, mild mitral tricupid an dpulmonary regurg with mild pulmonary htn, when compared to previous study of oct 2001 no significant change    History of PTCA 1 5/1992    lad     [place] (hard of hearing[place] 8/21/2015    Hyperlipidemia     Hypertension     Lymphoma ([facility] [address]     Dr Carroll Son Mobitz type 2 second degree atrioventricular block     Persistent atrial fibrillation ([facility] [address] 2016    Skin cancer 7/2014    face     PSHX:  has a past surgical history that includes Small intestine surgery; A-V cardiac pacemaker insertion; Coronary angioplasty with stent; Cholecystectomy; Inner ear surgery; Pacemaker insertion; pacemaker placement (5/2004); pacemaker placement (12/29/2011); and Skin cancer excision (11/2014). Allergies: Allergies   Allergen Reactions    Pcn [Penicillins] Diarrhea    Zocor [Simvastatin - High Dose]        FAM HX: family history includes Cancer in his mother; Diabetes in his brother; Heart Disease in his daughter and father.   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  Drug use: No    Sexual activity: Not on file   Lifestyle    Physical activity:     Days per week: Not on file     Minutes per session: Not on file    Stress: Not on file   Relationships    Social connections:     Talks on phone: Not on file     Gets together: Not on file     Attends Cheondo[employer] service: Not on file     Active member of club or organization: Not on file     Attends meetings of clubs or organizations: Not on file     Relationship status: Not on file    Intimate partner violence:     Fear of current or ex partner: Not on file     Emotionally abused: Not on file     Physically abused: Not on file     Forced sexual activity: Not on file   Other Topics Concern    Not on file   Social History Narrative    Not on file       Medications:   Medications:    aspirin  81 mg Oral Daily    atenolol  25 mg Oral Daily    hydrochlorothiazide  12.5 mg Oral Daily    isosorbide mononitrate  30 mg Oral Daily    pravastatin  1 tablet Oral Daily    ranolazine  1 tablet Oral BID    ramipril  5 mg Oral Daily    sodium chloride flush  10 mL Intravenous 2 times per day    [START ON 6/6/2019] aspirin  81 mg Oral Daily    enoxaparin  40 mg Subcutaneous Daily      Infusions:   PRN Meds:   nitroGLYCERIN 0.4 mg Q5 Min PRN   sodium chloride flush 10 mL PRN   magnesium hydroxide 30 mL Daily PRN   ondansetron 4 mg Q6H PRN     Home medications-   Prior to Admission medications    Medication Sig Start Date End Date Taking?  Authorizing Provider   hydrochlorothiazide (MICROZIDE) 12.5 MG capsule  4/26/19   Historical Provider, MD   triamcinolone (KENALOG) 0.1 % cream  4/16/19   Historical Provider, MD   ranolazine (RANEXA) 500 MG extended release tablet TAKE ONE TABLET BY MOUTH TWICE A DAY 5/13/19   Anil Bee MD   pravastatin (PRAVACHOL) 40 MG tablet TAKE ONE TABLET BY MOUTH EVERY DAY 1/8/19   Anil Bee MD   ramipril (ALTACE) 5 MG capsule TAKE 1 CAPSULE DAILY 1/8/19   Anil Bee MD   atenolol

## 2019-06-05 NOTE — ED NOTES
Pt request to have a bowel movement, Dr. Bakari Gutierrez states is okay to allow pt to go to restroom. Pt up to restroom and back using cane, gait steady, denies any chest pain. Pt request to sit up in chair. Chair moved close to cardiac monitor, pt up into chair, cardiac monitor on, pt remains in paced rhythm, wife at bedside.      Clorinda Phalen, RN  06/05/19 9163

## 2019-06-06 LAB
ANION GAP SERPL CALCULATED.3IONS-SCNC: 13 MMOL/L (ref 4–16)
ANION GAP SERPL CALCULATED.3IONS-SCNC: 14 MMOL/L (ref 4–16)
BUN BLDV-MCNC: 48 MG/DL (ref 6–23)
BUN BLDV-MCNC: 52 MG/DL (ref 6–23)
CALCIUM SERPL-MCNC: 8 MG/DL (ref 8.3–10.6)
CALCIUM SERPL-MCNC: 8.5 MG/DL (ref 8.3–10.6)
CHLORIDE BLD-SCNC: 101 MMOL/L (ref 99–110)
CHLORIDE BLD-SCNC: 97 MMOL/L (ref 99–110)
CO2: 23 MMOL/L (ref 21–32)
CO2: 24 MMOL/L (ref 21–32)
CREAT SERPL-MCNC: 1.7 MG/DL (ref 0.9–1.3)
CREAT SERPL-MCNC: 1.8 MG/DL (ref 0.9–1.3)
EKG ATRIAL RATE: 241 BPM
EKG DIAGNOSIS: NORMAL
EKG Q-T INTERVAL: 444 MS
EKG QRS DURATION: 180 MS
EKG QTC CALCULATION (BAZETT): 472 MS
EKG R AXIS: -67 DEGREES
EKG T AXIS: 100 DEGREES
EKG VENTRICULAR RATE: 68 BPM
GFR AFRICAN AMERICAN: 43 ML/MIN/1.73M2
GFR AFRICAN AMERICAN: 46 ML/MIN/1.73M2
GFR NON-AFRICAN AMERICAN: 36 ML/MIN/1.73M2
GFR NON-AFRICAN AMERICAN: 38 ML/MIN/1.73M2
GLUCOSE BLD-MCNC: 129 MG/DL (ref 70–99)
GLUCOSE BLD-MCNC: 93 MG/DL (ref 70–99)
HCT VFR BLD CALC: 40.8 % (ref 42–52)
HEMOGLOBIN: 13.1 GM/DL (ref 13.5–18)
MCH RBC QN AUTO: 29.9 PG (ref 27–31)
MCHC RBC AUTO-ENTMCNC: 32.1 % (ref 32–36)
MCV RBC AUTO: 93.2 FL (ref 78–100)
PDW BLD-RTO: 13.9 % (ref 11.7–14.9)
PLATELET # BLD: ABNORMAL K/CU MM (ref 140–440)
PMV BLD AUTO: 10.2 FL (ref 7.5–11.1)
POTASSIUM SERPL-SCNC: 4.3 MMOL/L (ref 3.5–5.1)
POTASSIUM SERPL-SCNC: 5 MMOL/L (ref 3.5–5.1)
RBC # BLD: 4.38 M/CU MM (ref 4.6–6.2)
SODIUM BLD-SCNC: 134 MMOL/L (ref 135–145)
SODIUM BLD-SCNC: 138 MMOL/L (ref 135–145)
WBC # BLD: 12.6 K/CU MM (ref 4–10.5)

## 2019-06-06 PROCEDURE — 93010 ELECTROCARDIOGRAM REPORT: CPT | Performed by: INTERNAL MEDICINE

## 2019-06-06 PROCEDURE — 80048 BASIC METABOLIC PNL TOTAL CA: CPT

## 2019-06-06 PROCEDURE — 36415 COLL VENOUS BLD VENIPUNCTURE: CPT

## 2019-06-06 PROCEDURE — 85027 COMPLETE CBC AUTOMATED: CPT

## 2019-06-06 PROCEDURE — 2580000003 HC RX 258: Performed by: INTERNAL MEDICINE

## 2019-06-06 PROCEDURE — 6360000002 HC RX W HCPCS: Performed by: INTERNAL MEDICINE

## 2019-06-06 PROCEDURE — 2140000000 HC CCU INTERMEDIATE R&B

## 2019-06-06 PROCEDURE — 94761 N-INVAS EAR/PLS OXIMETRY MLT: CPT

## 2019-06-06 PROCEDURE — 99232 SBSQ HOSP IP/OBS MODERATE 35: CPT | Performed by: INTERNAL MEDICINE

## 2019-06-06 PROCEDURE — 6370000000 HC RX 637 (ALT 250 FOR IP): Performed by: INTERNAL MEDICINE

## 2019-06-06 RX ORDER — HYDROCODONE BITARTRATE AND ACETAMINOPHEN 5; 325 MG/1; MG/1
1 TABLET ORAL EVERY 4 HOURS PRN
Status: DISCONTINUED | OUTPATIENT
Start: 2019-06-06 | End: 2019-06-07 | Stop reason: HOSPADM

## 2019-06-06 RX ORDER — SODIUM CHLORIDE 9 MG/ML
INJECTION, SOLUTION INTRAVENOUS CONTINUOUS
Status: DISCONTINUED | OUTPATIENT
Start: 2019-06-06 | End: 2019-06-07 | Stop reason: HOSPADM

## 2019-06-06 RX ADMIN — PRAVASTATIN SODIUM 40 MG: 40 TABLET ORAL at 09:35

## 2019-06-06 RX ADMIN — RANOLAZINE 500 MG: 500 TABLET, FILM COATED, EXTENDED RELEASE ORAL at 09:35

## 2019-06-06 RX ADMIN — RAMIPRIL 5 MG: 2.5 CAPSULE ORAL at 09:38

## 2019-06-06 RX ADMIN — HYDROCODONE BITARTRATE AND ACETAMINOPHEN 1 TABLET: 5; 325 TABLET ORAL at 12:47

## 2019-06-06 RX ADMIN — KETOROLAC TROMETHAMINE 15 MG: 30 INJECTION, SOLUTION INTRAMUSCULAR; INTRAVENOUS at 06:50

## 2019-06-06 RX ADMIN — HYDROCODONE BITARTRATE AND ACETAMINOPHEN 1 TABLET: 5; 325 TABLET ORAL at 20:29

## 2019-06-06 RX ADMIN — SODIUM CHLORIDE, PRESERVATIVE FREE 10 ML: 5 INJECTION INTRAVENOUS at 10:50

## 2019-06-06 RX ADMIN — SODIUM CHLORIDE: 9 INJECTION, SOLUTION INTRAVENOUS at 20:29

## 2019-06-06 RX ADMIN — ISOSORBIDE MONONITRATE 30 MG: 30 TABLET, EXTENDED RELEASE ORAL at 09:35

## 2019-06-06 RX ADMIN — RANOLAZINE 500 MG: 500 TABLET, FILM COATED, EXTENDED RELEASE ORAL at 20:29

## 2019-06-06 RX ADMIN — SODIUM CHLORIDE: 9 INJECTION, SOLUTION INTRAVENOUS at 12:17

## 2019-06-06 RX ADMIN — ENOXAPARIN SODIUM 30 MG: 30 INJECTION SUBCUTANEOUS at 09:35

## 2019-06-06 RX ADMIN — ASPIRIN 81 MG: 81 TABLET, COATED ORAL at 09:35

## 2019-06-06 RX ADMIN — ATENOLOL 25 MG: 25 TABLET ORAL at 09:35

## 2019-06-06 RX ADMIN — HYDROCHLOROTHIAZIDE 12.5 MG: 12.5 TABLET ORAL at 09:35

## 2019-06-06 ASSESSMENT — PAIN SCALES - GENERAL
PAINLEVEL_OUTOF10: 5
PAINLEVEL_OUTOF10: 5
PAINLEVEL_OUTOF10: 7
PAINLEVEL_OUTOF10: 4
PAINLEVEL_OUTOF10: 0
PAINLEVEL_OUTOF10: 0
PAINLEVEL_OUTOF10: 1
PAINLEVEL_OUTOF10: 0

## 2019-06-06 ASSESSMENT — PAIN DESCRIPTION - LOCATION: LOCATION: BACK

## 2019-06-06 ASSESSMENT — PAIN DESCRIPTION - ORIENTATION: ORIENTATION: LOWER

## 2019-06-06 NOTE — PROGRESS NOTES
Today's plan: RECOMMEND COMFORT CARE      Admit Date:  6/5/2019    Subjective:pain in back      Chief complaints on admission: pain inback and chest        History of present illness:Wilder is a 80 y. o.year old who  presents with back pain and chest pain    Past medical history:    has a past medical history of CAD (coronary artery disease), Cancer ([facility] [address] Cardiac pacemaker, CHB (complete heart block) ([facility] [address] H/O cardiac catheterization, H/O cardiovascular stress test, H/O chest x-ray, H/O echocardiogram, History of PTCA 1, [place] (hard of hearing), Hyperlipidemia, Hypertension, Lymphoma ([address] Mobitz type 2 second degree atrioventricular block, Persistent atrial fibrillation ([address] and Skin cancer. Past surgical history:   has a past surgical history that includes Small intestine surgery; A-V cardiac pacemaker insertion; Coronary angioplasty with stent; Cholecystectomy; Inner ear surgery; Pacemaker insertion; pacemaker placement (5/2004); pacemaker placement (12/29/2011); and Skin cancer excision (11/2014). Social History:   reports that he has never smoked. He has never used smokeless tobacco. He reports that he does not drink alcohol or use drugs. Family history:  family history includes Cancer in his mother; Diabetes in his brother; Heart Disease in his daughter and father. Allergies   Allergen Reactions    Pcn [Penicillins] Diarrhea    Zocor [Simvastatin - High Dose]          Objective:   BP (!) 160/77   Pulse 57   Temp 97.6 °F (36.4 °C) (Oral)   Resp 21   Ht 5' 10\" (1.778 m)   Wt 158 lb 1.6 oz (71.7 kg)   SpO2 96%   BMI 22.68 kg/m²       Intake/Output Summary (Last 24 hours) at 6/6/2019 1249  Last data filed at 6/6/2019 1213  Gross per 24 hour   Intake 490 ml   Output --   Net 490 ml       TELEMETRY: Sinus     Physical Exam:  Constitutional:  Well developed, Well nourished, No acute distress, Non-toxic appearance.    HENT:  Normocephalic, Atraumatic, Bilateral external ears normal, Oropharynx moist, 12.6*   HGB 14.7 13.1*   HCT 47.0 40.8*   MCV 97.5 93.2   * 126  RESULTS CONFIRMED BY SMEAR REVIEW  *     BMP:   Recent Labs     06/05/19  0800 06/06/19  0425    138   K 4.0 5.0    101   CO2 20* 23   BUN 30* 48*   CREATININE 1.0 1.8*     LIVER PROFILE:   Recent Labs     06/05/19  0800   *   *   BILITOT 0.8   ALKPHOS 195*     PT/INR:   Recent Labs     06/05/19  0800   PROTIME 43.2*   INR 3.84     APTT:   Recent Labs     06/05/19  0800   APTT 33.8     BNP:  No results for input(s): BNP in the last 72 hours. TROPONIN: @TROPONINI:3@      Assessment:  80 y. o.year old who is admitted for          Plan:  1. Chest pain: stress test and echo ordered, patient is transferred from [place]  2. CAD: h.o PTCA of LAD in 1992, had fixed defect in inferior wall in 2017. Last [facility] was in 2011, patient is DNR, WILL cancel stress test, patient and family agrees,  3. Lymphoma: not controlled, has osseous metastasis,   4. PPM: stable  5. HTN/: stable  6. Dyslipidemia:stable        All labs, medications and tests reviewed, continue all other medications of all above medical condition listed as is.       Ashok Gonzalez MD 6/6/2019 12:49 PM

## 2019-06-06 NOTE — PROGRESS NOTES
[address]    Hospitalist Progress Note      Name:  Herrera Cox /Age/Sex: 3/5/1929  (80 y.o. male)   MRN & CSN:  [id number] & [id number] Admission Date/Time: 2019 11:41 AM   Location:  [address] [place] [address] PCP: Anamaria Lopes MD         Hospital Day: 2    Assessment and Plan:   Herrera Cox is a 80 y.o.  male  with past medical history of hypertension, CAD, history of complete heart block status post cardiac pacemaker, cardiac arrhythmia who was sent from [facility] ED for chest pain     # Unstable angina       Chest pain characterized as pressure - doesn't have it today   Mild troponin elevation   No further cardiac work up per patient and cardiologist agreement - due to advanced lymphoma with mets  Continue with ASA, Pravachol, Ranexa, Altace     # MUSTAPHA - pre-renal - not present on admission     Due to hypotension/hypoperfusion Scr- >1.8  Resume IV hydration and check BMP in am      # Possible cardiogenic shock - resolved     BP was 75/45- given fluid bolus and started on dopamine drip- now BP is back to normal   DC dopamine      # Hx of CAD - continue with aspirin Plavix, ranexa and may start Lopressor as pressure allows     # 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 -     Follow up with Oncology as OP   Back pain from mets with Compr fracture of T5 and L5 - Norco for pain control      Chronic conditions:  Complete Heart block S/p Pacer  HLD  Hard of hearing   Hypertension     Diet DIET CARDIAC; No Caffeine   DVT Prophylaxis [] Lovenox, []  Heparin, [] SCDs, []No VTE prophylaxis, patient ambulating   GI Prophylaxis [] PPI, [] H2 Blocker, [] No GI prophylaxis, patient is receiving diet/Tube Feeds   Code Status DNR-CC   Disposition Patient requires continued admission due to Unstable angina with cardiogenic shock    MDM [] Low, [x] Moderate,[]  High     History of Present Illness: Subjective     Patient Seen & Examined at the bedside      Patient is resting in bed with no distress on room air  No chest pain but complains of back pain   No Dizziness or SOB or syncopal feelings     Ten point ROS reviewed negative, unless as noted above    Objective: Intake/Output Summary (Last 24 hours) at 6/6/2019 1144  Last data filed at 6/6/2019 0847  Gross per 24 hour   Intake 130 ml   Output --   Net 130 ml      Vitals:   Vitals:    06/06/19 0913   BP: (!) 160/77   Pulse: 57   Resp: 21   Temp: 97.6 °F (36.4 °C)   SpO2:      Physical Exam:    GEN Awake male, resting in bed in no apparent distress. Appears given age. HENT Mucous membranes are moist.   RESP Clear to auscultation, no wheezes, rales or rhonchi. CARDIO/VASC -S1/S2 auscultated. Regular rate without appreciable murmurs, rubs, or gallops. Peripheral pulses equal bilaterally and palpable. No peripheral edema. GI Abdomen is soft without significant tenderness, masses, or guarding. Bowel sounds are normoactive. Rectal exam deferred.  Whitten catheter is not present. MSK No gross joint deformities. Spontaneous movement of all extremities  SKIN Normal coloration, warm, dry. NEURO Cranial nerves appear grossly intact, normal speech, no lateralizing weakness. PSYCH Awake, alert, oriented x 4. Affect appropriate.     Medications:   Medications:    aspirin  81 mg Oral Daily    atenolol  25 mg Oral Daily    hydrochlorothiazide  12.5 mg Oral Daily    isosorbide mononitrate  30 mg Oral Daily    pravastatin  40 mg Oral Daily    ranolazine  500 mg Oral BID    ramipril  5 mg Oral Daily    sodium chloride flush  10 mL Intravenous 2 times per day    enoxaparin  30 mg Subcutaneous Daily      Infusions:    DOPamine Stopped (06/05/19 2012)     PRN Meds:   nitroGLYCERIN 0.4 mg Q5 Min PRN   sodium chloride flush 10 mL PRN   magnesium hydroxide 30 mL Daily PRN   ondansetron 4 mg Q6H PRN   ketorolac 15 mg Q6H PRN         Electronically signed by Marla Gil MD on 6/6/2019 at 11:44 AM

## 2019-06-07 VITALS
HEIGHT: 70 IN | DIASTOLIC BLOOD PRESSURE: 89 MMHG | WEIGHT: 165.8 LBS | SYSTOLIC BLOOD PRESSURE: 140 MMHG | BODY MASS INDEX: 23.74 KG/M2 | HEART RATE: 50 BPM | OXYGEN SATURATION: 100 % | TEMPERATURE: 98.1 F | RESPIRATION RATE: 18 BRPM

## 2019-06-07 LAB
ANION GAP SERPL CALCULATED.3IONS-SCNC: 8 MMOL/L (ref 4–16)
BUN BLDV-MCNC: 38 MG/DL (ref 6–23)
CALCIUM SERPL-MCNC: 8.2 MG/DL (ref 8.3–10.6)
CHLORIDE BLD-SCNC: 100 MMOL/L (ref 99–110)
CO2: 26 MMOL/L (ref 21–32)
CREAT SERPL-MCNC: 1.2 MG/DL (ref 0.9–1.3)
GFR AFRICAN AMERICAN: >60 ML/MIN/1.73M2
GFR NON-AFRICAN AMERICAN: 57 ML/MIN/1.73M2
GLUCOSE BLD-MCNC: 110 MG/DL (ref 70–99)
INR BLD: 1.4 INDEX
POTASSIUM SERPL-SCNC: 5.2 MMOL/L (ref 3.5–5.1)
PROTHROMBIN TIME: 15.9 SECONDS (ref 9.12–12.5)
SODIUM BLD-SCNC: 134 MMOL/L (ref 135–145)

## 2019-06-07 PROCEDURE — 6370000000 HC RX 637 (ALT 250 FOR IP): Performed by: INTERNAL MEDICINE

## 2019-06-07 PROCEDURE — 36415 COLL VENOUS BLD VENIPUNCTURE: CPT

## 2019-06-07 PROCEDURE — 85610 PROTHROMBIN TIME: CPT

## 2019-06-07 PROCEDURE — 80048 BASIC METABOLIC PNL TOTAL CA: CPT

## 2019-06-07 PROCEDURE — 6360000002 HC RX W HCPCS: Performed by: INTERNAL MEDICINE

## 2019-06-07 RX ORDER — HYDROCODONE BITARTRATE AND ACETAMINOPHEN 5; 325 MG/1; MG/1
1 TABLET ORAL EVERY 4 HOURS PRN
Qty: 10 TABLET | Refills: 0 | Status: SHIPPED | OUTPATIENT
Start: 2019-06-07 | End: 2019-06-10

## 2019-06-07 RX ADMIN — ASPIRIN 81 MG: 81 TABLET, COATED ORAL at 09:37

## 2019-06-07 RX ADMIN — ISOSORBIDE MONONITRATE 30 MG: 30 TABLET, EXTENDED RELEASE ORAL at 09:38

## 2019-06-07 RX ADMIN — HYDROCHLOROTHIAZIDE 12.5 MG: 12.5 TABLET ORAL at 09:37

## 2019-06-07 RX ADMIN — HYDROCODONE BITARTRATE AND ACETAMINOPHEN 1 TABLET: 5; 325 TABLET ORAL at 11:39

## 2019-06-07 RX ADMIN — PRAVASTATIN SODIUM 40 MG: 40 TABLET ORAL at 09:37

## 2019-06-07 RX ADMIN — RANOLAZINE 500 MG: 500 TABLET, FILM COATED, EXTENDED RELEASE ORAL at 09:37

## 2019-06-07 RX ADMIN — RAMIPRIL 5 MG: 2.5 CAPSULE ORAL at 09:38

## 2019-06-07 RX ADMIN — ENOXAPARIN SODIUM 30 MG: 30 INJECTION SUBCUTANEOUS at 09:38

## 2019-06-07 RX ADMIN — ATENOLOL 25 MG: 25 TABLET ORAL at 09:37

## 2019-06-07 ASSESSMENT — PAIN SCALES - GENERAL
PAINLEVEL_OUTOF10: 0
PAINLEVEL_OUTOF10: 0
PAINLEVEL_OUTOF10: 3

## 2019-06-07 NOTE — DISCHARGE SUMMARY
walker instead of a lesser assistive device, such as a cane or crutch. The need for this equipment was discussed with the patient and he understands and is in agreement. Patient is hemodynamically stable for DC to home    The patient expressed appropriate understanding of and agreement with the discharge recommendations, medications, and plan. Consults this admission:  [address]    Discharge Instruction:   Follow up appointments: Cardiology   Primary care physician:  within 1 to 2 weeks    Diet:  regular diet   Activity: activity as tolerated  Disposition: Discharged to:   [x]Home, [][facility], []SNF, []Acute Rehab, []Hospice   Condition on discharge: Stable    Discharge Medications:      [facility] Medication Instructions STM:[id number]    Printed on:06/07/19 1040   Medication Information                      aspirin 81 MG EC tablet  Take 81 mg by mouth daily. atenolol (TENORMIN) 50 MG tablet  TAKE ONE AND ONE-HALF (1 & 1/2) TABLET BY MOUTH DAILY             hydrochlorothiazide (MICROZIDE) 12.5 MG capsule               HYDROcodone-acetaminophen (NORCO) 5-325 MG per tablet  Take 1 tablet by mouth every 4 hours as needed for Pain for up to 3 days. isosorbide mononitrate (IMDUR) 30 MG extended release tablet  TAKE TWO TABLETS BY MOUTH DAILY             loratadine (CLARITIN) 10 MG tablet  Take 10 mg by mouth daily. Multiple Vitamin (MULTI-VITAMIN PO)  Take 1 tablet by mouth daily. nitroGLYCERIN (NITROSTAT) 0.4 MG SL tablet  Place 1 tablet under the tongue every 5 minutes as needed. omeprazole (PRILOSEC) 20 MG capsule  Take 20 mg by mouth daily.                pravastatin (PRAVACHOL) 40 MG tablet  TAKE ONE TABLET BY MOUTH EVERY DAY             ramipril (ALTACE) 5 MG capsule  TAKE 1 CAPSULE DAILY             ranolazine (RANEXA) 500 MG extended release tablet  TAKE ONE TABLET BY MOUTH TWICE A DAY triamcinolone (KENALOG) 0.1 % cream               warfarin (COUMADIN) 5 MG tablet  As directed per Dr. Ajay Almaraz (Dr. Ajay Almaraz monitors PT/INR). Subjective _ patient is resting in bed with no distress- no chest pain, back pain has improved     Objective Findings at Discharge:   BP (!) 140/89   Pulse 50   Temp 98.1 °F (36.7 °C) (Oral)   Resp 18   Ht 5' 10\" (1.778 m)   Wt 165 lb 12.8 oz (75.2 kg)   SpO2 100%   BMI 23.79 kg/m²            PHYSICAL EXAM   GEN Awake male, resting in bed in no apparent distress. Appears given age. HEENT Mucous membranes are moist.  RESP Clear to auscultation, no wheezes, rales or rhonchi. CARDIO/VAS - S1/S2 auscultated. Regular rate without appreciable murmurs, rubs, or gallops. Peripheral pulses equal bilaterally and palpable. No peripheral edema. GI Abdomen is soft without significant tenderness, masses, or guarding. Bowel sounds are normoactive  MSK No gross joint deformities. Spontaneous movement of all extremities  SKIN Normal coloration, warm, dry. NEURO Cranial nerves appear grossly intact, normal speech, no lateralizing weakness.     BMP/CBC  Recent Labs     06/05/19  0800 06/06/19  0425 06/06/19  1301 06/07/19  0726    138 134* 134*   K 4.0 5.0 4.3 5.2*    101 97* 100   CO2 20* 23 24 26   BUN 30* 48* 52* 38*   CREATININE 1.0 1.8* 1.7* 1.2   WBC 9.1 12.6*  --   --    HCT 47.0 40.8*  --   --    * 126  RESULTS CONFIRMED BY SMEAR REVIEW  *  --   --          Discharge Time of 33 minutes    Electronically signed by Abdulaziz Esqueda MD on 6/7/2019 at 10:40 AM

## 2019-06-07 NOTE — PLAN OF CARE
Problem: Pain:  Description  Pain management should include both nonpharmacologic and pharmacologic interventions.   Goal: Pain level will decrease  Description  Pain level will decrease  6/7/2019 1111 by Saint Muta, RN  Outcome: Completed  6/7/2019 0816 by Saint Muta, RN  Outcome: Ongoing  6/7/2019 0024 by Delfino Ballesteros RN  Outcome: Ongoing  Goal: Control of acute pain  Description  Control of acute pain  6/7/2019 1111 by Saint Muta, RN  Outcome: Completed  6/7/2019 0816 by Saint Muta, RN  Outcome: Ongoing  6/7/2019 0024 by Delfino Ballesteros RN  Outcome: Ongoing  Goal: Control of chronic pain  Description  Control of chronic pain  6/7/2019 1111 by Saint Muta, RN  Outcome: Completed  6/7/2019 0816 by Saint Muta, RN  Outcome: Ongoing  6/7/2019 0024 by Delfino Ballesteros RN  Outcome: Ongoing     Problem: Falls - Risk of:  Goal: Will remain free from falls  Description  Will remain free from falls  6/7/2019 1111 by Saint Muta, RN  Outcome: Completed  6/7/2019 0816 by Saint Muta, RN  Outcome: Ongoing  6/7/2019 0024 by Delfino Ballesteros RN  Outcome: Ongoing  Goal: Absence of physical injury  Description  Absence of physical injury  6/7/2019 1111 by Saint Muta, RN  Outcome: Completed  6/7/2019 0816 by Saint Muta, RN  Outcome: Ongoing  6/7/2019 0024 by Delfino Ballesteros RN  Outcome: Ongoing

## 2019-06-07 NOTE — PLAN OF CARE
Problem: Pain:  Description  Pain management should include both nonpharmacologic and pharmacologic interventions.   Goal: Pain level will decrease  Description  Pain level will decrease  6/7/2019 0816 by Graham Ascencio RN  Outcome: Ongoing  6/7/2019 0024 by Katiuska Simon RN  Outcome: Ongoing  Goal: Control of acute pain  Description  Control of acute pain  6/7/2019 0816 by Graham Ascencio RN  Outcome: Ongoing  6/7/2019 0024 by Katiuska Simon RN  Outcome: Ongoing  Goal: Control of chronic pain  Description  Control of chronic pain  6/7/2019 0816 by Graham Ascencio RN  Outcome: Ongoing  6/7/2019 0024 by Katiuska Simon RN  Outcome: Ongoing     Problem: Falls - Risk of:  Goal: Will remain free from falls  Description  Will remain free from falls  6/7/2019 0816 by Graham Ascencio RN  Outcome: Ongoing  6/7/2019 0024 by Katiuska Simon RN  Outcome: Ongoing  Goal: Absence of physical injury  Description  Absence of physical injury  6/7/2019 0816 by Graham Ascencio RN  Outcome: Ongoing  6/7/2019 0024 by Katiuska Simon RN  Outcome: Ongoing

## 2019-07-02 ENCOUNTER — HOSPITAL ENCOUNTER (OUTPATIENT)
Age: 84
Discharge: HOME | End: 2019-07-02
Payer: MEDICARE

## 2019-07-02 LAB
INR BLD: 3.12 INDEX
PROTHROMBIN TIME: 35.2 SECONDS (ref 9.12–12.5)

## 2019-07-02 PROCEDURE — 85610 PROTHROMBIN TIME: CPT

## 2019-07-02 PROCEDURE — 36415 COLL VENOUS BLD VENIPUNCTURE: CPT

## 2019-07-10 ENCOUNTER — HOSPITAL ENCOUNTER (OUTPATIENT)
Age: 84
Setting detail: SPECIMEN
Discharge: HOME | End: 2019-07-10
Payer: MEDICARE

## 2019-07-10 LAB
ALBUMIN SERPL-MCNC: 4 GM/DL (ref 3.4–5)
ALP BLD-CCNC: 152 IU/L (ref 40–128)
ALT SERPL-CCNC: 18 U/L (ref 10–40)
ANION GAP SERPL CALCULATED.3IONS-SCNC: 12 MMOL/L (ref 4–16)
AST SERPL-CCNC: 24 IU/L (ref 15–37)
BILIRUB SERPL-MCNC: 0.5 MG/DL (ref 0–1)
BUN BLDV-MCNC: 19 MG/DL (ref 6–23)
CALCIUM SERPL-MCNC: 9.2 MG/DL (ref 8.3–10.6)
CHLORIDE BLD-SCNC: 94 MMOL/L (ref 99–110)
CO2: 28 MMOL/L (ref 21–32)
CREAT SERPL-MCNC: 1 MG/DL (ref 0.9–1.3)
GFR AFRICAN AMERICAN: >60 ML/MIN/1.73M2
GFR NON-AFRICAN AMERICAN: >60 ML/MIN/1.73M2
GLUCOSE BLD-MCNC: 102 MG/DL (ref 70–99)
POTASSIUM SERPL-SCNC: 4.5 MMOL/L (ref 3.5–5.1)
SODIUM BLD-SCNC: 134 MMOL/L (ref 135–145)
TOTAL PROTEIN: 6.2 GM/DL (ref 6.4–8.2)

## 2019-07-10 PROCEDURE — 80053 COMPREHEN METABOLIC PANEL: CPT

## 2019-07-19 LAB
BASOPHILS ABSOLUTE: ABNORMAL /ΜL
BASOPHILS RELATIVE PERCENT: ABNORMAL %
EOSINOPHILS ABSOLUTE: ABNORMAL /ΜL
EOSINOPHILS RELATIVE PERCENT: ABNORMAL %
HCT VFR BLD CALC: 38.7 % (ref 41–53)
HEMOGLOBIN: 12.7 G/DL (ref 13.5–17.5)
LYMPHOCYTES ABSOLUTE: ABNORMAL /ΜL
LYMPHOCYTES RELATIVE PERCENT: ABNORMAL %
MCH RBC QN AUTO: ABNORMAL PG
MCHC RBC AUTO-ENTMCNC: ABNORMAL G/DL
MCV RBC AUTO: ABNORMAL FL
MONOCYTES ABSOLUTE: ABNORMAL /ΜL
MONOCYTES RELATIVE PERCENT: ABNORMAL %
NEUTROPHILS ABSOLUTE: ABNORMAL /ΜL
NEUTROPHILS RELATIVE PERCENT: ABNORMAL %
PLATELET # BLD: 190 K/ΜL
PMV BLD AUTO: ABNORMAL FL
RBC # BLD: 4.23 10^6/ΜL
WBC # BLD: 5.7 10^3/ML

## 2019-07-23 ENCOUNTER — OFFICE VISIT (OUTPATIENT)
Dept: CARDIOLOGY CLINIC | Age: 84
End: 2019-07-23
Payer: MEDICARE

## 2019-07-23 VITALS
SYSTOLIC BLOOD PRESSURE: 128 MMHG | HEART RATE: 58 BPM | BODY MASS INDEX: 22.76 KG/M2 | WEIGHT: 159 LBS | DIASTOLIC BLOOD PRESSURE: 72 MMHG | HEIGHT: 70 IN | RESPIRATION RATE: 18 BRPM

## 2019-07-23 DIAGNOSIS — I20.8 STABLE ANGINA PECTORIS (HCC): ICD-10-CM

## 2019-07-23 DIAGNOSIS — Z95.0 CARDIAC PACEMAKER: Primary | ICD-10-CM

## 2019-07-23 DIAGNOSIS — I10 ESSENTIAL HYPERTENSION: ICD-10-CM

## 2019-07-23 DIAGNOSIS — I48.19 PERSISTENT ATRIAL FIBRILLATION (HCC): ICD-10-CM

## 2019-07-23 DIAGNOSIS — I25.10 CORONARY ARTERY DISEASE INVOLVING NATIVE CORONARY ARTERY OF NATIVE HEART WITHOUT ANGINA PECTORIS: ICD-10-CM

## 2019-07-23 PROCEDURE — 99214 OFFICE O/P EST MOD 30 MIN: CPT | Performed by: INTERNAL MEDICINE

## 2019-07-23 RX ORDER — HYDROCODONE BITARTRATE AND ACETAMINOPHEN 5; 325 MG/1; MG/1
1 TABLET ORAL EVERY 4 HOURS PRN
COMMUNITY

## 2019-07-23 RX ORDER — DOCUSATE SODIUM 100 MG/1
100 CAPSULE, LIQUID FILLED ORAL PRN
COMMUNITY

## 2019-07-23 NOTE — PATIENT INSTRUCTIONS
Continue current cardiovascular medications which have been reviewed and discussed individually with you. Continue device check as per care link schedule. Follow up with PCP for PT/INR regulation. Primary/secondary prevention is the goal by aggressive risk modification, healthy and therapeutic life style changes for cardiovascular risk reduction. Various goals are discussed and questions answered. Appropriate prescriptions if needed on this visit are addressed. After visit summery is provided. Questions answered and patient verbalizes understanding. Follow up in office in 6 months, sooner if needed.

## 2019-07-23 NOTE — PROGRESS NOTES
within the RA cavity. Moderately enlarged right ventricle cavity. Sclerotic, but non-stenotic aortic valve. Trivial aortic regurgitation is noted. RVSP is 41 mmHg. Mild to moderate tricuspid regurgitation. No evidence of pericardial effusion. LAB REVIEW:    CBC:   Lab Results   Component Value Date    WBC 12.6 06/06/2019    HGB 13.1 06/06/2019    HCT 40.8 06/06/2019      RESULTS CONFIRMED BY SMEAR REVIEW   06/06/2019     Lipids:   Lab Results   Component Value Date    CHOL 141 08/02/2016    TRIG 195 (H) 08/02/2016    HDL 40 (L) 05/01/2019    LDLCALC 85 09/09/2014    LDLDIRECT 68 05/01/2019     Renal:   Lab Results   Component Value Date    BUN 19 07/10/2019    CREATININE 1.0 07/10/2019     07/10/2019    K 4.5 07/10/2019     PT/INR:   Lab Results   Component Value Date    INR 3.12 07/02/2019     IMPRESSION and RECOMMENDATIONS:      CAD (coronary artery disease)  Stable. Continue current medications. Continue aggressive risk modification for secondary prevention. Hypertension  Well-controlled on current medications. Continue the same. Cardiac pacemaker  Working well. Continue to monitor as per care link schedule. Chest pain  Improved and stable. Persistent atrial fibrillation (HCC)  Rate is controlled and patient is anticoagulated. Continue current cardiovascular medications which have been reviewed and discussed individually with you. Continue device check as per care link schedule. Follow up with PCP for PT/INR regulation. Primary/secondary prevention is the goal by aggressive risk modification, healthy and therapeutic life style changes for cardiovascular risk reduction. Various goals are discussed and questions answered. Appropriate prescriptions if needed on this visit are addressed. After visit summery is provided. Questions answered and patient verbalizes understanding. Follow up in office in 6 months, sooner if needed.     Iraida Ramos MD, 7/23/2019

## 2019-08-01 ENCOUNTER — HOSPITAL ENCOUNTER (OUTPATIENT)
Age: 84
Discharge: HOME | End: 2019-08-01
Payer: MEDICARE

## 2019-08-01 LAB
INR BLD: 3.5 INDEX
PROTHROMBIN TIME: 39.4 SECONDS (ref 9.12–12.5)

## 2019-08-01 PROCEDURE — 36415 COLL VENOUS BLD VENIPUNCTURE: CPT

## 2019-08-01 PROCEDURE — 85610 PROTHROMBIN TIME: CPT

## 2019-08-02 ENCOUNTER — HOSPITAL ENCOUNTER (OUTPATIENT)
Age: 84
Setting detail: SPECIMEN
Discharge: HOME | End: 2019-08-02
Payer: MEDICARE

## 2019-08-02 LAB
ALBUMIN SERPL-MCNC: 4 GM/DL (ref 3.4–5)
ALP BLD-CCNC: 133 IU/L (ref 40–129)
ALT SERPL-CCNC: 20 U/L (ref 10–40)
ANION GAP SERPL CALCULATED.3IONS-SCNC: 11 MMOL/L (ref 4–16)
AST SERPL-CCNC: 23 IU/L (ref 15–37)
BILIRUB SERPL-MCNC: 0.6 MG/DL (ref 0–1)
BUN BLDV-MCNC: 19 MG/DL (ref 6–23)
CALCIUM SERPL-MCNC: 9 MG/DL (ref 8.3–10.6)
CHLORIDE BLD-SCNC: 101 MMOL/L (ref 99–110)
CO2: 23 MMOL/L (ref 21–32)
CREAT SERPL-MCNC: 1 MG/DL (ref 0.9–1.3)
GFR AFRICAN AMERICAN: >60 ML/MIN/1.73M2
GFR NON-AFRICAN AMERICAN: >60 ML/MIN/1.73M2
GLUCOSE BLD-MCNC: 168 MG/DL (ref 70–99)
IRON: 88 UG/DL (ref 59–158)
POTASSIUM SERPL-SCNC: 4.5 MMOL/L (ref 3.5–5.1)
SODIUM BLD-SCNC: 135 MMOL/L (ref 135–145)
TOTAL PROTEIN: 6.1 GM/DL (ref 6.4–8.2)

## 2019-08-02 PROCEDURE — 80053 COMPREHEN METABOLIC PANEL: CPT

## 2019-08-02 PROCEDURE — 83540 ASSAY OF IRON: CPT

## 2019-09-01 ENCOUNTER — PROCEDURE VISIT (OUTPATIENT)
Dept: CARDIOLOGY CLINIC | Age: 84
End: 2019-09-01
Payer: MEDICARE

## 2019-09-01 DIAGNOSIS — I44.30 AV BLOCK: Primary | ICD-10-CM

## 2019-09-01 DIAGNOSIS — Z95.0 CARDIAC PACEMAKER IN SITU: ICD-10-CM

## 2019-09-01 PROCEDURE — 93296 REM INTERROG EVL PM/IDS: CPT | Performed by: INTERNAL MEDICINE

## 2019-09-01 PROCEDURE — 93294 REM INTERROG EVL PM/LDLS PM: CPT | Performed by: INTERNAL MEDICINE

## 2019-09-03 ENCOUNTER — TELEPHONE (OUTPATIENT)
Dept: CARDIOLOGY CLINIC | Age: 84
End: 2019-09-03

## 2019-09-05 ENCOUNTER — HOSPITAL ENCOUNTER (OUTPATIENT)
Age: 84
Discharge: HOME | End: 2019-09-05
Payer: MEDICARE

## 2019-09-05 LAB
INR BLD: 2.01 INDEX
PROTHROMBIN TIME: 18.6 SECONDS (ref 8.9–12.9)

## 2019-09-05 PROCEDURE — 36415 COLL VENOUS BLD VENIPUNCTURE: CPT

## 2019-09-05 PROCEDURE — 85610 PROTHROMBIN TIME: CPT

## 2019-10-01 ENCOUNTER — HOSPITAL ENCOUNTER (OUTPATIENT)
Age: 84
Discharge: HOME | End: 2019-10-01
Payer: MEDICARE

## 2019-10-01 LAB
INR BLD: 2.13 INDEX
PROTHROMBIN TIME: 24.7 SECONDS (ref 9.12–12.5)

## 2019-10-01 PROCEDURE — 85610 PROTHROMBIN TIME: CPT

## 2019-10-01 PROCEDURE — 36415 COLL VENOUS BLD VENIPUNCTURE: CPT

## 2019-10-04 ENCOUNTER — HOSPITAL ENCOUNTER (OUTPATIENT)
Age: 84
Setting detail: SPECIMEN
Discharge: HOME | End: 2019-10-04
Payer: MEDICARE

## 2019-10-04 LAB — LACTATE DEHYDROGENASE: 362 IU/L (ref 120–246)

## 2019-10-04 PROCEDURE — 83615 LACTATE (LD) (LDH) ENZYME: CPT

## 2019-11-04 ENCOUNTER — HOSPITAL ENCOUNTER (OUTPATIENT)
Age: 84
Discharge: HOME | End: 2019-11-04
Payer: MEDICARE

## 2019-11-04 LAB
INR BLD: 2.36 INDEX
PROTHROMBIN TIME: 27.4 SECONDS (ref 11.7–14.5)

## 2019-11-04 PROCEDURE — 36415 COLL VENOUS BLD VENIPUNCTURE: CPT

## 2019-11-04 PROCEDURE — 85610 PROTHROMBIN TIME: CPT

## 2019-11-23 RX ORDER — RANOLAZINE 500 MG/1
TABLET, EXTENDED RELEASE ORAL
Qty: 180 TABLET | Refills: 3 | Status: SHIPPED | OUTPATIENT
Start: 2019-11-23 | End: 2020-09-08 | Stop reason: SDUPTHER

## 2019-11-25 ENCOUNTER — HOSPITAL ENCOUNTER (OUTPATIENT)
Dept: CT IMAGING | Age: 84
Discharge: HOME | End: 2019-11-25
Payer: MEDICARE

## 2019-11-25 DIAGNOSIS — C82.01 FOLLICULAR LYMPHOMA GRADE I, LYMPH NODES OF HEAD, FACE, AND NECK (HCC): ICD-10-CM

## 2019-11-25 LAB
GFR AFRICAN AMERICAN: >60 ML/MIN/1.73M2
GFR NON-AFRICAN AMERICAN: >60 ML/MIN/1.73M2
POC CREATININE: 1.1 MG/DL (ref 0.9–1.3)

## 2019-11-25 PROCEDURE — 71260 CT THORAX DX C+: CPT

## 2019-11-25 PROCEDURE — 6360000004 HC RX CONTRAST MEDICATION: Performed by: INTERNAL MEDICINE

## 2019-11-25 PROCEDURE — 74177 CT ABD & PELVIS W/CONTRAST: CPT

## 2019-11-25 RX ADMIN — IOPAMIDOL 75 ML: 755 INJECTION, SOLUTION INTRAVENOUS at 15:33

## 2019-11-25 RX ADMIN — IOHEXOL 50 ML: 240 INJECTION, SOLUTION INTRATHECAL; INTRAVASCULAR; INTRAVENOUS; ORAL at 14:00

## 2019-12-04 ENCOUNTER — HOSPITAL ENCOUNTER (OUTPATIENT)
Age: 84
Discharge: HOME | End: 2019-12-04
Payer: MEDICARE

## 2019-12-04 LAB
INR BLD: 2 INDEX
PROTHROMBIN TIME: 23.1 SECONDS (ref 11.7–14.5)

## 2019-12-04 PROCEDURE — 85610 PROTHROMBIN TIME: CPT

## 2019-12-04 PROCEDURE — 36415 COLL VENOUS BLD VENIPUNCTURE: CPT

## 2019-12-09 ENCOUNTER — PROCEDURE VISIT (OUTPATIENT)
Dept: CARDIOLOGY CLINIC | Age: 84
End: 2019-12-09
Payer: MEDICARE

## 2019-12-09 DIAGNOSIS — Z95.0 CARDIAC PACEMAKER IN SITU: ICD-10-CM

## 2019-12-09 DIAGNOSIS — I44.30 AV BLOCK: Primary | ICD-10-CM

## 2019-12-09 PROCEDURE — 93296 REM INTERROG EVL PM/IDS: CPT | Performed by: INTERNAL MEDICINE

## 2019-12-09 PROCEDURE — 93294 REM INTERROG EVL PM/LDLS PM: CPT | Performed by: INTERNAL MEDICINE

## 2020-01-06 ENCOUNTER — HOSPITAL ENCOUNTER (OUTPATIENT)
Age: 85
Discharge: HOME | End: 2020-01-06
Payer: MEDICARE

## 2020-01-06 LAB
ALBUMIN SERPL-MCNC: 4.2 GM/DL (ref 3.4–5)
ALP BLD-CCNC: 152 IU/L (ref 40–129)
ALT SERPL-CCNC: 20 U/L (ref 10–40)
ANION GAP SERPL CALCULATED.3IONS-SCNC: 18 MMOL/L (ref 4–16)
AST SERPL-CCNC: 24 IU/L (ref 15–37)
BASOPHILS ABSOLUTE: 0 K/CU MM
BASOPHILS RELATIVE PERCENT: 0.3 % (ref 0–1)
BILIRUB SERPL-MCNC: 0.3 MG/DL (ref 0–1)
BUN BLDV-MCNC: 33 MG/DL (ref 6–23)
CALCIUM SERPL-MCNC: 9.9 MG/DL (ref 8.3–10.6)
CHLORIDE BLD-SCNC: 95 MMOL/L (ref 99–110)
CO2: 21 MMOL/L (ref 21–32)
CREAT SERPL-MCNC: 1.4 MG/DL (ref 0.9–1.3)
DIFFERENTIAL TYPE: ABNORMAL
EOSINOPHILS ABSOLUTE: 0.2 K/CU MM
EOSINOPHILS RELATIVE PERCENT: 2.3 % (ref 0–3)
GFR AFRICAN AMERICAN: 58 ML/MIN/1.73M2
GFR NON-AFRICAN AMERICAN: 48 ML/MIN/1.73M2
GLUCOSE FASTING: 130 MG/DL (ref 70–99)
HCT VFR BLD CALC: 37.9 % (ref 42–52)
HEMOGLOBIN: 12.2 GM/DL (ref 13.5–18)
IMMATURE NEUTROPHIL %: 0.6 % (ref 0–0.43)
INR BLD: 2.94 INDEX
LACTATE DEHYDROGENASE: 245 IU/L (ref 120–246)
LYMPHOCYTES ABSOLUTE: 1.1 K/CU MM
LYMPHOCYTES RELATIVE PERCENT: 13.2 % (ref 24–44)
MCH RBC QN AUTO: 28.4 PG (ref 27–31)
MCHC RBC AUTO-ENTMCNC: 32.2 % (ref 32–36)
MCV RBC AUTO: 88.1 FL (ref 78–100)
MONOCYTES ABSOLUTE: 1.4 K/CU MM
MONOCYTES RELATIVE PERCENT: 15.6 % (ref 0–4)
PDW BLD-RTO: 14.1 % (ref 11.7–14.9)
PLATELET # BLD: 258 K/CU MM (ref 140–440)
PMV BLD AUTO: 8.9 FL (ref 7.5–11.1)
POTASSIUM SERPL-SCNC: 4.8 MMOL/L (ref 3.5–5.1)
PROTHROMBIN TIME: 34.3 SECONDS (ref 11.7–14.5)
RBC # BLD: 4.3 M/CU MM (ref 4.6–6.2)
SEGMENTED NEUTROPHILS ABSOLUTE COUNT: 5.9 K/CU MM
SEGMENTED NEUTROPHILS RELATIVE PERCENT: 68 % (ref 36–66)
SODIUM BLD-SCNC: 134 MMOL/L (ref 135–145)
TOTAL IMMATURE NEUTOROPHIL: 0.05 K/CU MM
TOTAL PROTEIN: 7.4 GM/DL (ref 6.4–8.2)
WBC # BLD: 8.6 K/CU MM (ref 4–10.5)

## 2020-01-06 PROCEDURE — 36415 COLL VENOUS BLD VENIPUNCTURE: CPT

## 2020-01-06 PROCEDURE — 83615 LACTATE (LD) (LDH) ENZYME: CPT

## 2020-01-06 PROCEDURE — 85610 PROTHROMBIN TIME: CPT

## 2020-01-06 PROCEDURE — 85025 COMPLETE CBC W/AUTO DIFF WBC: CPT

## 2020-01-06 PROCEDURE — 80053 COMPREHEN METABOLIC PANEL: CPT

## 2020-01-16 RX ORDER — PRAVASTATIN SODIUM 40 MG
TABLET ORAL
Qty: 90 TABLET | Refills: 3 | Status: SHIPPED | OUTPATIENT
Start: 2020-01-16

## 2020-01-23 RX ORDER — RAMIPRIL 5 MG/1
CAPSULE ORAL
Qty: 90 CAPSULE | Refills: 3 | Status: SHIPPED | OUTPATIENT
Start: 2020-01-23

## 2020-01-24 ENCOUNTER — HOSPITAL ENCOUNTER (OUTPATIENT)
Dept: INFUSION THERAPY | Age: 85
Discharge: HOME | End: 2020-01-24
Payer: MEDICARE

## 2020-01-24 VITALS — HEIGHT: 69 IN | BODY MASS INDEX: 23.91 KG/M2 | WEIGHT: 161.4 LBS

## 2020-01-24 LAB
ALBUMIN SERPL-MCNC: 3.9 GM/DL (ref 3.4–5)
ALP BLD-CCNC: 125 IU/L (ref 40–129)
ALT SERPL-CCNC: 14 U/L (ref 10–40)
ANION GAP SERPL CALCULATED.3IONS-SCNC: 16 MMOL/L (ref 4–16)
AST SERPL-CCNC: 24 IU/L (ref 15–37)
BASOPHILS ABSOLUTE: 0.1 K/CU MM
BASOPHILS RELATIVE PERCENT: 1 % (ref 0–1)
BILIRUB SERPL-MCNC: 0.5 MG/DL (ref 0–1)
BUN BLDV-MCNC: 21 MG/DL (ref 6–23)
CALCIUM SERPL-MCNC: 8.9 MG/DL (ref 8.3–10.6)
CHLORIDE BLD-SCNC: 94 MMOL/L (ref 99–110)
CO2: 24 MMOL/L (ref 21–32)
CREAT SERPL-MCNC: 1.1 MG/DL (ref 0.9–1.3)
DIFFERENTIAL TYPE: ABNORMAL
EOSINOPHILS ABSOLUTE: 0.1 K/CU MM
EOSINOPHILS RELATIVE PERCENT: 2 % (ref 0–3)
GFR AFRICAN AMERICAN: >60 ML/MIN/1.73M2
GFR NON-AFRICAN AMERICAN: >60 ML/MIN/1.73M2
GLUCOSE BLD-MCNC: 115 MG/DL (ref 70–99)
HCT VFR BLD CALC: 32.9 % (ref 42–52)
HEMOGLOBIN: 10.8 GM/DL (ref 13.5–18)
LYMPHOCYTES ABSOLUTE: 0.7 K/CU MM
LYMPHOCYTES RELATIVE PERCENT: 10 % (ref 24–44)
MCH RBC QN AUTO: 27.6 PG (ref 27–31)
MCHC RBC AUTO-ENTMCNC: 32.8 % (ref 32–36)
MCV RBC AUTO: 84.1 FL (ref 78–100)
MONOCYTES ABSOLUTE: 0.9 K/CU MM
MONOCYTES RELATIVE PERCENT: 13 % (ref 0–4)
PDW BLD-RTO: 14.9 % (ref 11.7–14.9)
PLATELET # BLD: 235 K/CU MM (ref 140–440)
PMV BLD AUTO: 9.3 FL (ref 7.5–11.1)
POTASSIUM SERPL-SCNC: 4.4 MMOL/L (ref 3.5–5.1)
RBC # BLD: 3.91 M/CU MM (ref 4.6–6.2)
SEGMENTED NEUTROPHILS ABSOLUTE COUNT: 5.1 K/CU MM
SEGMENTED NEUTROPHILS RELATIVE PERCENT: 74 % (ref 36–66)
SODIUM BLD-SCNC: 134 MMOL/L (ref 135–145)
TOTAL PROTEIN: 6.1 GM/DL (ref 6.4–8.2)
WBC # BLD: 6.9 K/CU MM (ref 4–10.5)

## 2020-01-24 PROCEDURE — 6370000000 HC RX 637 (ALT 250 FOR IP)

## 2020-01-24 PROCEDURE — 85025 COMPLETE CBC W/AUTO DIFF WBC: CPT

## 2020-01-24 PROCEDURE — 96375 TX/PRO/DX INJ NEW DRUG ADDON: CPT

## 2020-01-24 PROCEDURE — 96413 CHEMO IV INFUSION 1 HR: CPT

## 2020-01-24 PROCEDURE — 36415 COLL VENOUS BLD VENIPUNCTURE: CPT

## 2020-01-24 PROCEDURE — 2580000003 HC RX 258: Performed by: INTERNAL MEDICINE

## 2020-01-24 PROCEDURE — 96415 CHEMO IV INFUSION ADDL HR: CPT

## 2020-01-24 PROCEDURE — 6360000002 HC RX W HCPCS

## 2020-01-24 PROCEDURE — 6360000002 HC RX W HCPCS: Performed by: INTERNAL MEDICINE

## 2020-01-24 PROCEDURE — 80053 COMPREHEN METABOLIC PANEL: CPT

## 2020-01-24 RX ORDER — DIPHENHYDRAMINE HYDROCHLORIDE 50 MG/ML
INJECTION INTRAMUSCULAR; INTRAVENOUS
Status: DISCONTINUED
Start: 2020-01-24 | End: 2020-01-24 | Stop reason: HOSPADM

## 2020-01-24 RX ORDER — ACETAMINOPHEN 325 MG/1
TABLET ORAL
Status: DISCONTINUED
Start: 2020-01-24 | End: 2020-01-24 | Stop reason: HOSPADM

## 2020-01-24 RX ORDER — ACETAMINOPHEN 325 MG/1
650 TABLET ORAL ONCE
Status: DISCONTINUED | OUTPATIENT
Start: 2020-01-24 | End: 2020-01-25 | Stop reason: HOSPADM

## 2020-01-24 RX ORDER — DEXAMETHASONE SODIUM PHOSPHATE 10 MG/ML
INJECTION, SOLUTION INTRAMUSCULAR; INTRAVENOUS
Status: DISCONTINUED
Start: 2020-01-24 | End: 2020-01-24 | Stop reason: HOSPADM

## 2020-01-24 RX ORDER — DIPHENHYDRAMINE HYDROCHLORIDE 50 MG/ML
50 INJECTION INTRAMUSCULAR; INTRAVENOUS ONCE
Status: DISCONTINUED | OUTPATIENT
Start: 2020-01-24 | End: 2020-01-25 | Stop reason: HOSPADM

## 2020-01-27 ENCOUNTER — OFFICE VISIT (OUTPATIENT)
Dept: CARDIOLOGY CLINIC | Age: 85
End: 2020-01-27
Payer: MEDICARE

## 2020-01-27 VITALS
RESPIRATION RATE: 16 BRPM | BODY MASS INDEX: 23.19 KG/M2 | HEART RATE: 72 BPM | SYSTOLIC BLOOD PRESSURE: 134 MMHG | HEIGHT: 70 IN | WEIGHT: 162 LBS | DIASTOLIC BLOOD PRESSURE: 80 MMHG

## 2020-01-27 PROBLEM — I20.0 UNSTABLE ANGINA (HCC): Status: RESOLVED | Noted: 2019-06-05 | Resolved: 2020-01-27

## 2020-01-27 PROCEDURE — 99214 OFFICE O/P EST MOD 30 MIN: CPT | Performed by: INTERNAL MEDICINE

## 2020-01-27 NOTE — ASSESSMENT & PLAN NOTE
Working well. Continue to monitor as per care link schedule. Remaining average life for the device is 2.5 years.

## 2020-01-27 NOTE — PATIENT INSTRUCTIONS
Continue current cardiovascular medications which have been reviewed and discussed individually with you. Primary/secondary prevention is the goal by aggressive risk modification, healthy and therapeutic life style changes for cardiovascular risk reduction. Various goals are discussed and questions answered. Continue device check as per care link schedule. Appropriate prescriptions if needed on this visit are addressed. After visit summery is provided. Questions answered and patient verbalizes understanding. Follow up in 6 months with ECG,  sooner if needed.

## 2020-01-27 NOTE — PROGRESS NOTES
Divya Kaylee Fitzrandolph  3/5/1929  Kaur Jordan MD    Chief complaint and HPI:  Lauren Brooke  is a 80 y.o. male following up for known coronary artery disease status post the intervention by PCI, persistent atrial fibrillation on warfarin therapy for anticoagulation, status post permanent pacemaker implantation, hypertension and hyperlipidemia. He has chest discomfort when he overexerts and this has been stable for the last several months. He stays active for his age walks with a cane and is receiving chemotherapy off-and-on for lymphoma and recently had it a booster dose on last Friday which he seems to tolerate well. He doesn't smoke or drink alcohol. He is compliant to his medications and he brought them all and we have reviewed them. He monitors his blood pressure regularly at home and he brought those numbers and his blood pressure is fairly well controlled. Rest of the Cardiovascular system review is otherwise unchanged from prior encounter. Past medical history:  has a past medical history of A-fib ([facility] [address] CAD (coronary artery disease), Cancer ([address] Cardiac pacemaker, CHB (complete heart block) ([address] H/O cardiac catheterization, H/O cardiovascular stress test, H/O chest x-ray, H/O echocardiogram, History of PTCA 1, [place] (hard of hearing), Hyperlipidemia, Hypertension, Lymphoma ([address] Mobitz type 2 second degree atrioventricular block, Persistent atrial fibrillation, and Skin cancer. Past surgical history:  has a past surgical history that includes Small intestine surgery; A-V cardiac pacemaker insertion; Coronary angioplasty with stent; Cholecystectomy; Inner ear surgery; Pacemaker insertion; pacemaker placement (5/2004); pacemaker placement (12/29/2011); and Skin cancer excision (11/2014).   Social History:   Social History     Tobacco Use    Smoking status: Never Smoker    Smokeless tobacco: Never Used   Substance Use Topics    Alcohol use: No     Comment: DID IN COLLEGE     Family loratadine (CLARITIN) 10 MG tablet Take 10 mg by mouth daily. Yes Historical Provider, MD   aspirin 81 MG EC tablet Take 81 mg by mouth daily. Yes Historical Provider, MD   omeprazole (PRILOSEC) 20 MG capsule Take 20 mg by mouth daily. Yes Historical Provider, MD     Vitals:    01/27/20 0911   BP: 134/80   Site: Left Upper Arm   Position: Sitting   Cuff Size: Medium Adult   Pulse: 72   Resp: 16   Weight: 162 lb (73.5 kg)   Height: 5' 10\" (1.778 m)      Body mass index is 23.24 kg/m². Wt Readings from Last 3 Encounters:   01/27/20 162 lb (73.5 kg)   01/24/20 161 lb 6.4 oz (73.2 kg)   07/23/19 159 lb (72.1 kg)     Constitutional:  Patient is pleasant elderly male in no apparent distress. Lost 2 pounds since last visit. Eyes:  Pupils are equal.  He wears glasses. No conjunctival pallor noted. NECK: No JVP or thyromegaly  Cardiovascular: Auscultation: Normal S1 and S2.  1/6 systolic ejection murmur noted in aortic area. Carotids are negative for bruits. Respiratory:  Respiratory effort is normal.  Breath sounds are clear to auscultation. Extremities:  No edema, clubbing,  Cyanosis, petechiae. SKIN: Warm and well perfused, no pallor or cyanosis. Excessive ecchymosis noted in the left forearm from chemotherapy infusion IV site. Abdomen:  No masses or tenderness. No organomegaly noted. Neurologic:  Oriented to time, place, and person and non-anxious. No focal neurological deficit noted. Psychiatric: Normal mood and effect. 12/9/2019 Pacer analysis is reviewed is consistent with normal dual-chamber non-MRI Medtronic pacer function with stable leads and appropriate battery status for the age of the device. Remaining average battery life is 2.5 years. Device is 100% pacing in the ventricle.  Patient is in persistent at fibrillation and on warfarin for anticoagulation therapy.     LAB REVIEW:    CBC:   Lab Results   Component Value Date    WBC 6.9 01/24/2020    HGB 10.8 01/24/2020    HCT 32.9

## 2020-02-04 ENCOUNTER — HOSPITAL ENCOUNTER (OUTPATIENT)
Age: 85
Discharge: HOME | End: 2020-02-04
Payer: MEDICARE

## 2020-02-04 LAB
INR BLD: 2.57 INDEX
PROTHROMBIN TIME: 29.9 SECONDS (ref 11.7–14.5)

## 2020-02-04 PROCEDURE — 85610 PROTHROMBIN TIME: CPT

## 2020-02-04 PROCEDURE — 36415 COLL VENOUS BLD VENIPUNCTURE: CPT

## 2020-03-04 ENCOUNTER — HOSPITAL ENCOUNTER (OUTPATIENT)
Age: 85
Discharge: HOME | End: 2020-03-04
Payer: MEDICARE

## 2020-03-04 LAB
INR BLD: 2.59 INDEX
PROTHROMBIN TIME: 30.1 SECONDS (ref 11.7–14.5)

## 2020-03-04 PROCEDURE — 36415 COLL VENOUS BLD VENIPUNCTURE: CPT

## 2020-03-04 PROCEDURE — 85610 PROTHROMBIN TIME: CPT

## 2020-03-10 RX ORDER — ATENOLOL 50 MG/1
TABLET ORAL
Qty: 135 TABLET | Refills: 2 | Status: SHIPPED | OUTPATIENT
Start: 2020-03-10

## 2020-03-10 RX ORDER — ISOSORBIDE MONONITRATE 30 MG/1
TABLET, EXTENDED RELEASE ORAL
Qty: 180 TABLET | Refills: 2 | Status: SHIPPED | OUTPATIENT
Start: 2020-03-10

## 2020-03-15 ENCOUNTER — PROCEDURE VISIT (OUTPATIENT)
Dept: CARDIOLOGY CLINIC | Age: 85
End: 2020-03-15
Payer: MEDICARE

## 2020-03-15 PROCEDURE — 93294 REM INTERROG EVL PM/LDLS PM: CPT | Performed by: INTERNAL MEDICINE

## 2020-03-15 PROCEDURE — 93296 REM INTERROG EVL PM/IDS: CPT | Performed by: INTERNAL MEDICINE

## 2020-03-20 ENCOUNTER — HOSPITAL ENCOUNTER (OUTPATIENT)
Dept: INFUSION THERAPY | Age: 85
Discharge: HOME | End: 2020-03-20
Payer: MEDICARE

## 2020-03-20 VITALS — WEIGHT: 161.4 LBS | HEIGHT: 69 IN | BODY MASS INDEX: 23.91 KG/M2

## 2020-03-20 LAB
ALBUMIN SERPL-MCNC: 3.6 GM/DL (ref 3.4–5)
ALP BLD-CCNC: 133 IU/L (ref 40–128)
ALT SERPL-CCNC: 16 U/L (ref 10–40)
ANION GAP SERPL CALCULATED.3IONS-SCNC: 14 MMOL/L (ref 4–16)
AST SERPL-CCNC: 23 IU/L (ref 15–37)
BASOPHILS ABSOLUTE: 0.1 K/CU MM
BASOPHILS RELATIVE PERCENT: 0.9 % (ref 0–1)
BILIRUB SERPL-MCNC: 0.4 MG/DL (ref 0–1)
BUN BLDV-MCNC: 20 MG/DL (ref 6–23)
CALCIUM SERPL-MCNC: 9.1 MG/DL (ref 8.3–10.6)
CHLORIDE BLD-SCNC: 94 MMOL/L (ref 99–110)
CO2: 23 MMOL/L (ref 21–32)
CREAT SERPL-MCNC: 1 MG/DL (ref 0.9–1.3)
DIFFERENTIAL TYPE: ABNORMAL
EOSINOPHILS ABSOLUTE: 0.1 K/CU MM
EOSINOPHILS RELATIVE PERCENT: 1.2 % (ref 0–3)
GFR AFRICAN AMERICAN: >60 ML/MIN/1.73M2
GFR AFRICAN AMERICAN: >60 ML/MIN/1.73M2
GFR NON-AFRICAN AMERICAN: >60 ML/MIN/1.73M2
GFR NON-AFRICAN AMERICAN: >60 ML/MIN/1.73M2
GLUCOSE BLD-MCNC: 173 MG/DL (ref 70–99)
GLUCOSE BLD-MCNC: 180 MG/DL (ref 70–99)
HCT VFR BLD CALC: 32.3 % (ref 42–52)
HEMOGLOBIN: 10.6 GM/DL (ref 13.5–18)
LYMPHOCYTES ABSOLUTE: 0.5 K/CU MM
LYMPHOCYTES RELATIVE PERCENT: 9.4 % (ref 24–44)
MCH RBC QN AUTO: 26.6 PG (ref 27–31)
MCHC RBC AUTO-ENTMCNC: 32.8 % (ref 32–36)
MCV RBC AUTO: 81.2 FL (ref 78–100)
MONOCYTES ABSOLUTE: 0.6 K/CU MM
MONOCYTES RELATIVE PERCENT: 9.7 % (ref 0–4)
PDW BLD-RTO: 16.8 % (ref 11.7–14.9)
PLATELET # BLD: 291 K/CU MM (ref 140–440)
PMV BLD AUTO: 10.3 FL (ref 7.5–11.1)
POC CALCIUM: 1.27 MMOL/L (ref 1.12–1.32)
POC CHLORIDE: 97 MMOL/L (ref 98–109)
POC CREATININE: 1 MG/DL (ref 0.9–1.3)
POTASSIUM SERPL-SCNC: 3.8 MMOL/L (ref 3.6–5.1)
POTASSIUM SERPL-SCNC: 4.3 MMOL/L (ref 3.5–5.1)
RBC # BLD: 3.98 M/CU MM (ref 4.6–6.2)
SEGMENTED NEUTROPHILS ABSOLUTE COUNT: 4.5 K/CU MM
SEGMENTED NEUTROPHILS RELATIVE PERCENT: 78.8 % (ref 36–66)
SODIUM BLD-SCNC: 131 MMOL/L (ref 135–145)
SODIUM BLD-SCNC: 134 MMOL/L (ref 136–145)
TOTAL PROTEIN: 5.8 GM/DL (ref 6.4–8.2)
WBC # BLD: ABNORMAL K/CU MM (ref 4–10.5)

## 2020-03-20 PROCEDURE — 2580000003 HC RX 258: Performed by: INTERNAL MEDICINE

## 2020-03-20 PROCEDURE — 6360000002 HC RX W HCPCS

## 2020-03-20 PROCEDURE — 96375 TX/PRO/DX INJ NEW DRUG ADDON: CPT

## 2020-03-20 PROCEDURE — 96415 CHEMO IV INFUSION ADDL HR: CPT

## 2020-03-20 PROCEDURE — 80053 COMPREHEN METABOLIC PANEL: CPT

## 2020-03-20 PROCEDURE — 6370000000 HC RX 637 (ALT 250 FOR IP)

## 2020-03-20 PROCEDURE — 85025 COMPLETE CBC W/AUTO DIFF WBC: CPT

## 2020-03-20 PROCEDURE — 6360000002 HC RX W HCPCS: Performed by: INTERNAL MEDICINE

## 2020-03-20 PROCEDURE — 36415 COLL VENOUS BLD VENIPUNCTURE: CPT

## 2020-03-20 PROCEDURE — 96413 CHEMO IV INFUSION 1 HR: CPT

## 2020-03-20 RX ORDER — ACETAMINOPHEN 325 MG/1
TABLET ORAL
Status: DISPENSED
Start: 2020-03-20 | End: 2020-03-20

## 2020-03-20 RX ORDER — ACETAMINOPHEN 325 MG/1
650 TABLET ORAL ONCE
Status: DISCONTINUED | OUTPATIENT
Start: 2020-03-20 | End: 2020-03-21 | Stop reason: HOSPADM

## 2020-03-20 RX ORDER — DEXAMETHASONE SODIUM PHOSPHATE 10 MG/ML
INJECTION, SOLUTION INTRAMUSCULAR; INTRAVENOUS
Status: DISPENSED
Start: 2020-03-20 | End: 2020-03-20

## 2020-03-20 RX ORDER — DIPHENHYDRAMINE HYDROCHLORIDE 50 MG/ML
50 INJECTION INTRAMUSCULAR; INTRAVENOUS ONCE
Status: DISCONTINUED | OUTPATIENT
Start: 2020-03-20 | End: 2020-03-21 | Stop reason: HOSPADM

## 2020-03-20 RX ORDER — DIPHENHYDRAMINE HYDROCHLORIDE 50 MG/ML
INJECTION INTRAMUSCULAR; INTRAVENOUS
Status: DISPENSED
Start: 2020-03-20 | End: 2020-03-20

## 2020-03-23 ENCOUNTER — HOSPITAL ENCOUNTER (EMERGENCY)
Age: 85
Discharge: HOME | End: 2020-03-23
Attending: EMERGENCY MEDICINE
Payer: MEDICARE

## 2020-03-23 VITALS
OXYGEN SATURATION: 98 % | BODY MASS INDEX: 22.9 KG/M2 | SYSTOLIC BLOOD PRESSURE: 140 MMHG | HEIGHT: 70 IN | TEMPERATURE: 97.8 F | RESPIRATION RATE: 16 BRPM | DIASTOLIC BLOOD PRESSURE: 80 MMHG | WEIGHT: 160 LBS | HEART RATE: 66 BPM

## 2020-03-23 LAB
ALBUMIN SERPL-MCNC: 3.7 GM/DL (ref 3.4–5)
ALP BLD-CCNC: 132 IU/L (ref 40–129)
ALT SERPL-CCNC: 22 U/L (ref 10–40)
ANION GAP SERPL CALCULATED.3IONS-SCNC: 17 MMOL/L (ref 4–16)
AST SERPL-CCNC: 31 IU/L (ref 15–37)
BASOPHILS ABSOLUTE: 0 K/CU MM
BASOPHILS RELATIVE PERCENT: 0.3 % (ref 0–1)
BILIRUB SERPL-MCNC: 0.3 MG/DL (ref 0–1)
BUN BLDV-MCNC: 25 MG/DL (ref 6–23)
CALCIUM SERPL-MCNC: 8.9 MG/DL (ref 8.3–10.6)
CHLORIDE BLD-SCNC: 96 MMOL/L (ref 99–110)
CO2: 21 MMOL/L (ref 21–32)
CREAT SERPL-MCNC: 1.1 MG/DL (ref 0.9–1.3)
DIFFERENTIAL TYPE: ABNORMAL
EOSINOPHILS ABSOLUTE: 0.1 K/CU MM
EOSINOPHILS RELATIVE PERCENT: 0.9 % (ref 0–3)
GFR AFRICAN AMERICAN: >60 ML/MIN/1.73M2
GFR NON-AFRICAN AMERICAN: >60 ML/MIN/1.73M2
GLUCOSE BLD-MCNC: 185 MG/DL (ref 70–99)
HCT VFR BLD CALC: 32.9 % (ref 42–52)
HEMOGLOBIN: 9.9 GM/DL (ref 13.5–18)
IMMATURE NEUTROPHIL %: 5.8 % (ref 0–0.43)
INR BLD: 5.75 INDEX
LYMPHOCYTES ABSOLUTE: 0.7 K/CU MM
LYMPHOCYTES RELATIVE PERCENT: 9.3 % (ref 24–44)
MCH RBC QN AUTO: 26 PG (ref 27–31)
MCHC RBC AUTO-ENTMCNC: 30.1 % (ref 32–36)
MCV RBC AUTO: 86.4 FL (ref 78–100)
MONOCYTES ABSOLUTE: 0.9 K/CU MM
MONOCYTES RELATIVE PERCENT: 11.4 % (ref 0–4)
PDW BLD-RTO: 15.9 % (ref 11.7–14.9)
PLATELET # BLD: 274 K/CU MM (ref 140–440)
PMV BLD AUTO: 9.2 FL (ref 7.5–11.1)
POTASSIUM SERPL-SCNC: 4.8 MMOL/L (ref 3.5–5.1)
PROTHROMBIN TIME: 67.9 SECONDS (ref 11.7–14.5)
RBC # BLD: 3.81 M/CU MM (ref 4.6–6.2)
SEGMENTED NEUTROPHILS ABSOLUTE COUNT: 5.6 K/CU MM
SEGMENTED NEUTROPHILS RELATIVE PERCENT: 72.3 % (ref 36–66)
SODIUM BLD-SCNC: 134 MMOL/L (ref 135–145)
TOTAL IMMATURE NEUTOROPHIL: 0.45 K/CU MM
TOTAL PROTEIN: 6 GM/DL (ref 6.4–8.2)
WBC # BLD: 7.7 K/CU MM (ref 4–10.5)

## 2020-03-23 PROCEDURE — 99283 EMERGENCY DEPT VISIT LOW MDM: CPT

## 2020-03-23 PROCEDURE — 80053 COMPREHEN METABOLIC PANEL: CPT

## 2020-03-23 PROCEDURE — 85610 PROTHROMBIN TIME: CPT

## 2020-03-23 PROCEDURE — 85025 COMPLETE CBC W/AUTO DIFF WBC: CPT

## 2020-03-23 PROCEDURE — 6370000000 HC RX 637 (ALT 250 FOR IP): Performed by: EMERGENCY MEDICINE

## 2020-03-23 RX ORDER — PHYTONADIONE 5 MG/1
5 TABLET ORAL ONCE
Status: COMPLETED | OUTPATIENT
Start: 2020-03-23 | End: 2020-03-23

## 2020-03-23 RX ADMIN — PHYTONADIONE 5 MG: 5 TABLET ORAL at 21:35

## 2020-03-24 NOTE — ED NOTES
Discharge instructions reviewed and pt acknowledges understanding. Ambulatory at discharge.      Candida Nam RN  03/23/20 [id number]

## 2020-03-24 NOTE — ED PROVIDER NOTES
Hyperlipidemia     Hypertension     Lymphoma ([facility] [address]     Dr Cristino Yoo follicular    Mobitz type 2 second degree atrioventricular block     Persistent atrial fibrillation 2016    Skin cancer 7/2014    face     Past Surgical History:   Procedure Laterality Date    A-V CARDIAC PACEMAKER INSERTION      CHOLECYSTECTOMY      CORONARY ANGIOPLASTY WITH STENT PLACEMENT      INNER EAR SURGERY      PACEMAKER INSERTION      PACEMAKER PLACEMENT  5/2004    medtronic    PACEMAKER PLACEMENT  12/29/2011    battery replacement medtronic TRACEY huynh, [id number]    SKIN CANCER EXCISION  11/2014    skin cancer removed from face    SMALL INTESTINE SURGERY       Family History   Problem Relation Age of Onset    Diabetes Brother     Cancer Mother     Heart Disease Father     Heart Disease Daughter      Social History     Socioeconomic History    Marital status:      Spouse name: naty    Number of children: 2    Years of education: Not on file    Highest education level: Not on file   Occupational History    Occupation: retired   Social Needs    Financial resource strain: Not on file    Food insecurity     Worry: Not on file     Inability: Not on file   Elevate needs     Medical: Not on file     Non-medical: Not on file   Tobacco Use    Smoking status: Never Smoker    Smokeless tobacco: Never Used   Substance and Sexual Activity    Alcohol use: No     Comment: DID IN COLLEGE    Drug use: No    Sexual activity: Not on file   Lifestyle    Physical activity     Days per week: Not on file     Minutes per session: Not on file    Stress: Not on file   Relationships    Social connections     Talks on phone: Not on file     Gets together: Not on file     Attends [employer] service: Not on file     Active member of club or organization: Not on file     Attends meetings of clubs or organizations: Not on file     Relationship status: Not on file    Intimate partner violence     Fear of current or ex partner: Not on file     Emotionally abused: Not on file     Physically abused: Not on file     Forced sexual activity: Not on file   Other Topics Concern    Not on file   Social History Narrative    Not on file     No current facility-administered medications for this encounter. Current Outpatient Medications   Medication Sig Dispense Refill    isosorbide mononitrate (IMDUR) 30 MG extended release tablet TAKE TWO TABLETS BY MOUTH DAILY 180 tablet 2    atenolol (TENORMIN) 50 MG tablet TAKE ONE AND ONE-HALF TABLET BY MOUTH DAILY 135 tablet 2    ramipril (ALTACE) 5 MG capsule TAKE ONE CAPSULE BY MOUTH DAILY 90 capsule 3    pravastatin (PRAVACHOL) 40 MG tablet TAKE ONE TABLET BY MOUTH DAILY 90 tablet 3    ranolazine (RANEXA) 500 MG extended release tablet TAKE ONE TABLET BY MOUTH TWICE A  tablet 3    HYDROcodone-acetaminophen (NORCO) 5-325 MG per tablet Take 1 tablet by mouth every 4 hours as needed for Pain.  docusate sodium (COLACE) 100 MG capsule Take 100 mg by mouth as needed for Constipation      riTUXimab (RITUXAN IV) Infuse intravenously See Admin Instructions Chemotherapy medication      Zoledronic Acid (ZOMETA IV) Infuse intravenously See Admin Instructions Chemotherapy medication      hydrochlorothiazide (MICROZIDE) 12.5 MG capsule Take 12.5 mg by mouth every morning       warfarin (COUMADIN) 5 MG tablet As directed per Dr. Sherle Boxer (Dr. Sherle Boxer monitors PT/INR).  nitroGLYCERIN (NITROSTAT) 0.4 MG SL tablet Place 1 tablet under the tongue every 5 minutes as needed. 25 tablet 2    Multiple Vitamin (MULTI-VITAMIN PO) Take 1 tablet by mouth daily.  loratadine (CLARITIN) 10 MG tablet Take 10 mg by mouth daily.  aspirin 81 MG EC tablet Take 81 mg by mouth daily.  omeprazole (PRILOSEC) 20 MG capsule Take 20 mg by mouth daily.          Allergies   Allergen Reactions    Pcn [Penicillins] Diarrhea    Zocor [Simvastatin - High Dose]        Nursing Notes Reviewed    Physical Exam:  ED Triage Vitals [03/23/20 2018]   Enc Vitals Group      BP (!) 140/80      Pulse 66      Resp 16      Temp 97.8 °F (36.6 °C)      Temp Source Oral      SpO2 98 %      Weight 160 lb (72.6 kg)      Height 5' 10\" (1.778 m)      Head Circumference       Peak Flow       Pain Score       Pain Loc       Pain Edu? Excl. in [address]? GENERAL APPEARANCE: Awake and alert. Cooperative. No acute distress. HEAD: Normocephalic. Atraumatic. EYES: EOM's grossly intact. Sclera anicteric. ENT: Mucous membranes are moist. Tolerates saliva. No trismus. NECK: Supple. Trachea midline. HEART: RRR. Radial pulses 2+. LUNGS: Respirations unlabored. CTAB  ABDOMEN: Soft. Non-tender. No guarding or rebound. Non distended. RECTAL: Normal tone. Gross blood on digital exam.  No hemorrhoids or fissures. EXTREMITIES: No acute deformities. SKIN: Warm and dry. NEUROLOGICAL: No gross facial drooping. Moves all 4 extremities spontaneously. PSYCHIATRIC: Normal mood.     I have reviewed and interpreted all of the currently available lab results from this visit (if applicable):  Results for orders placed or performed during the hospital encounter of 03/23/20   Comprehensive Metabolic Panel w/ Reflex to MG   Result Value Ref Range    Sodium 134 (L) 135 - 145 MMOL/L    Potassium 4.8 3.5 - 5.1 MMOL/L    Chloride 96 (L) 99 - 110 mMol/L    CO2 21 21 - 32 MMOL/L    BUN 25 (H) 6 - 23 MG/DL    CREATININE 1.1 0.9 - 1.3 MG/DL    Glucose 185 (H) 70 - 99 MG/DL    Calcium 8.9 8.3 - 10.6 MG/DL    Alb 3.7 3.4 - 5.0 GM/DL    Total Protein 6.0 (L) 6.4 - 8.2 GM/DL    Total Bilirubin 0.3 0.0 - 1.0 MG/DL    ALT 22 10 - 40 U/L    AST 31 15 - 37 IU/L    Alkaline Phosphatase 132 (H) 40 - 129 IU/L    GFR Non-African American >60 >60 mL/min/1.73m2    GFR African American >60 >60 mL/min/1.73m2    Anion Gap 17 (H) 4 - 16   CBC Auto Differential   Result Value Ref Range    WBC 7.7 4.0 - 10.5 K/CU MM    RBC 3.81 (L) 4.6 - 6.2 M/CU MM Hemoglobin 9.9 (L) 13.5 - 18.0 GM/DL    Hematocrit 32.9 (L) 42 - 52 %    MCV 86.4 78 - 100 FL    MCH 26.0 (L) 27 - 31 PG    MCHC 30.1 (L) 32.0 - 36.0 %    RDW 15.9 (H) 11.7 - 14.9 %    Platelets 664 291 - 509 K/CU MM    MPV 9.2 7.5 - 11.1 FL    Differential Type AUTOMATED DIFFERENTIAL     Segs Relative 72.3 (H) 36 - 66 %    Lymphocytes % 9.3 (L) 24 - 44 %    Monocytes % 11.4 (H) 0 - 4 %    Eosinophils % 0.9 0 - 3 %    Basophils % 0.3 0 - 1 %    Segs Absolute 5.6 K/CU MM    Lymphocytes Absolute 0.7 K/CU MM    Monocytes Absolute 0.9 K/CU MM    Eosinophils Absolute 0.1 K/CU MM    Basophils Absolute 0.0 K/CU MM    Immature Neutrophil % 5.8 (H) 0 - 0.43 %    Total Immature Neutrophil 0.45 K/CU MM   PT - INR   Result Value Ref Range    Protime 67.9 (H) 11.7 - 14.5 SECONDS    INR 5.75 (HH) INDEX      Radiographs (if obtained):  [] The following radiograph was interpreted by myself in the absence of a radiologist:  [] Radiologist's Report Reviewed:    EKG (if obtained): (All EKG's are interpreted by myself in the absence of a cardiologist)    MDM:  Plan of care is discussed thoroughly with the patient and family if present. If performed, all imaging and lab work also discussed with patient. All relevant prior results and chart reviewed if available. Patient presents as above. He is hemodynamically stable. He is in no acute distress and has a benign abdominal exam.  He has some gross blood on his rectal exam which is otherwise unremarkable. Patient's CBC not indicative of need for emergent transfusion at this time. Patient's INR is 5.75. He is given dose of oral vitamin K here. He is instructed to hold his Coumadin until instructed to restart by his PCP. He will need to have his INR rechecked later this week. Metabolic panel is largely unremarkable. Again, as patient is comfortable, hemodynamically stable and is a DNR CC, I do not feel that he requires admission at this time.   Instructed to return for dizziness, difficulty breathing or pain. Clinical Impression:  1. Lower GI bleed    2.  Supratherapeutic INR      (Please note that portions of this note may have been completed with a voice recognition program. Efforts were made to edit the dictations but occasionally words are mis-transcribed.)    MD Thuy Rosas MD  03/23/20 [id number]

## 2020-03-26 ENCOUNTER — HOSPITAL ENCOUNTER (OUTPATIENT)
Age: 85
Discharge: HOME | End: 2020-03-26
Payer: MEDICARE

## 2020-03-26 LAB
BASOPHILS ABSOLUTE: 0 K/CU MM
BASOPHILS RELATIVE PERCENT: 0.3 % (ref 0–1)
DIFFERENTIAL TYPE: ABNORMAL
EOSINOPHILS ABSOLUTE: 0.1 K/CU MM
EOSINOPHILS RELATIVE PERCENT: 0.8 % (ref 0–3)
HCT VFR BLD CALC: 32.7 % (ref 42–52)
HEMOGLOBIN: 9.8 GM/DL (ref 13.5–18)
IMMATURE NEUTROPHIL %: 5.4 % (ref 0–0.43)
LYMPHOCYTES ABSOLUTE: 0.8 K/CU MM
LYMPHOCYTES RELATIVE PERCENT: 8.3 % (ref 24–44)
MCH RBC QN AUTO: 26.3 PG (ref 27–31)
MCHC RBC AUTO-ENTMCNC: 30 % (ref 32–36)
MCV RBC AUTO: 87.9 FL (ref 78–100)
MONOCYTES ABSOLUTE: 1.4 K/CU MM
MONOCYTES RELATIVE PERCENT: 13.7 % (ref 0–4)
PDW BLD-RTO: 16.1 % (ref 11.7–14.9)
PLATELET # BLD: 331 K/CU MM (ref 140–440)
PMV BLD AUTO: 9 FL (ref 7.5–11.1)
RBC # BLD: 3.72 M/CU MM (ref 4.6–6.2)
SEGMENTED NEUTROPHILS ABSOLUTE COUNT: 7.1 K/CU MM
SEGMENTED NEUTROPHILS RELATIVE PERCENT: 71.5 % (ref 36–66)
TOTAL IMMATURE NEUTOROPHIL: 0.54 K/CU MM
WBC # BLD: 10 K/CU MM (ref 4–10.5)

## 2020-03-26 PROCEDURE — 36415 COLL VENOUS BLD VENIPUNCTURE: CPT

## 2020-03-26 PROCEDURE — 85025 COMPLETE CBC W/AUTO DIFF WBC: CPT

## 2020-04-03 ENCOUNTER — HOSPITAL ENCOUNTER (OUTPATIENT)
Age: 85
Discharge: HOME | End: 2020-04-03
Payer: MEDICARE

## 2020-04-03 LAB
INR BLD: 1.26 INDEX
PROTHROMBIN TIME: 14.4 SECONDS (ref 11.7–14.5)

## 2020-04-03 PROCEDURE — 36415 COLL VENOUS BLD VENIPUNCTURE: CPT

## 2020-04-03 PROCEDURE — 85610 PROTHROMBIN TIME: CPT

## 2020-04-28 ENCOUNTER — HOSPITAL ENCOUNTER (OUTPATIENT)
Dept: GENERAL RADIOLOGY | Age: 85
Discharge: HOME | End: 2020-04-28
Payer: MEDICARE

## 2020-04-28 ENCOUNTER — HOSPITAL ENCOUNTER (OUTPATIENT)
Age: 85
Discharge: HOME | End: 2020-04-28
Payer: MEDICARE

## 2020-04-28 PROCEDURE — 73502 X-RAY EXAM HIP UNI 2-3 VIEWS: CPT

## 2020-04-28 PROCEDURE — 73552 X-RAY EXAM OF FEMUR 2/>: CPT

## 2020-05-04 ENCOUNTER — HOSPITAL ENCOUNTER (OUTPATIENT)
Age: 85
Discharge: HOME | End: 2020-05-04
Payer: MEDICARE

## 2020-05-04 LAB
INR BLD: 1.44 INDEX
PROTHROMBIN TIME: 16.5 SECONDS (ref 11.7–14.5)

## 2020-05-04 PROCEDURE — 36415 COLL VENOUS BLD VENIPUNCTURE: CPT

## 2020-05-04 PROCEDURE — 85610 PROTHROMBIN TIME: CPT

## 2020-05-19 ENCOUNTER — HOSPITAL ENCOUNTER (OUTPATIENT)
Dept: CT IMAGING | Age: 85
Discharge: HOME | End: 2020-05-19
Payer: MEDICARE

## 2020-05-19 PROCEDURE — 74177 CT ABD & PELVIS W/CONTRAST: CPT

## 2020-05-19 PROCEDURE — 71260 CT THORAX DX C+: CPT

## 2020-05-19 PROCEDURE — 6360000004 HC RX CONTRAST MEDICATION: Performed by: INTERNAL MEDICINE

## 2020-05-19 RX ADMIN — IOHEXOL 50 ML: 240 INJECTION, SOLUTION INTRATHECAL; INTRAVASCULAR; INTRAVENOUS; ORAL at 12:35

## 2020-05-19 RX ADMIN — IOPAMIDOL 100 ML: 755 INJECTION, SOLUTION INTRAVENOUS at 12:35

## 2020-05-27 ENCOUNTER — HOSPITAL ENCOUNTER (OUTPATIENT)
Dept: INFUSION THERAPY | Age: 85
Discharge: HOME | End: 2020-05-27
Payer: MEDICARE

## 2020-05-27 PROCEDURE — 99211 OFF/OP EST MAY X REQ PHY/QHP: CPT

## 2020-06-02 ENCOUNTER — HOSPITAL ENCOUNTER (OUTPATIENT)
Age: 85
Discharge: HOME | End: 2020-06-02
Payer: MEDICARE

## 2020-06-02 LAB
ALBUMIN SERPL-MCNC: 4 GM/DL (ref 3.4–5)
ALP BLD-CCNC: 122 IU/L (ref 40–129)
ALT SERPL-CCNC: 16 U/L (ref 10–40)
ANION GAP SERPL CALCULATED.3IONS-SCNC: 9 MMOL/L (ref 4–16)
AST SERPL-CCNC: 26 IU/L (ref 15–37)
BILIRUB SERPL-MCNC: 0.5 MG/DL (ref 0–1)
BUN BLDV-MCNC: 23 MG/DL (ref 6–23)
CALCIUM SERPL-MCNC: 10 MG/DL (ref 8.3–10.6)
CHLORIDE BLD-SCNC: 93 MMOL/L (ref 99–110)
CHOLESTEROL, FASTING: 112 MG/DL
CO2: 31 MMOL/L (ref 21–32)
CREAT SERPL-MCNC: 1.1 MG/DL (ref 0.9–1.3)
GFR AFRICAN AMERICAN: >60 ML/MIN/1.73M2
GFR NON-AFRICAN AMERICAN: >60 ML/MIN/1.73M2
GLUCOSE FASTING: 112 MG/DL (ref 70–99)
HDLC SERPL-MCNC: 44 MG/DL
LDL CHOLESTEROL DIRECT: 55 MG/DL
POTASSIUM SERPL-SCNC: 5 MMOL/L (ref 3.5–5.1)
SODIUM BLD-SCNC: 133 MMOL/L (ref 135–145)
TOTAL PROTEIN: 7.3 GM/DL (ref 6.4–8.2)
TRIGLYCERIDE, FASTING: 102 MG/DL
TSH HIGH SENSITIVITY: 7.63 UIU/ML (ref 0.27–4.2)

## 2020-06-02 PROCEDURE — 36415 COLL VENOUS BLD VENIPUNCTURE: CPT

## 2020-06-02 PROCEDURE — 80053 COMPREHEN METABOLIC PANEL: CPT

## 2020-06-02 PROCEDURE — 80061 LIPID PANEL: CPT

## 2020-06-02 PROCEDURE — 84443 ASSAY THYROID STIM HORMONE: CPT

## 2020-06-16 ENCOUNTER — HOSPITAL ENCOUNTER (OUTPATIENT)
Age: 85
Discharge: HOME | End: 2020-06-16
Payer: MEDICARE

## 2020-06-16 LAB
ALBUMIN SERPL-MCNC: 3.8 GM/DL (ref 3.4–5)
ALP BLD-CCNC: 113 IU/L (ref 40–129)
ALT SERPL-CCNC: 14 U/L (ref 10–40)
ANION GAP SERPL CALCULATED.3IONS-SCNC: 17 MMOL/L (ref 4–16)
AST SERPL-CCNC: 24 IU/L (ref 15–37)
BILIRUB SERPL-MCNC: 0.4 MG/DL (ref 0–1)
BUN BLDV-MCNC: 19 MG/DL (ref 6–23)
CALCIUM SERPL-MCNC: 9.9 MG/DL (ref 8.3–10.6)
CHLORIDE BLD-SCNC: 94 MMOL/L (ref 99–110)
CHOLESTEROL, FASTING: 106 MG/DL
CO2: 24 MMOL/L (ref 21–32)
CREAT SERPL-MCNC: 1 MG/DL (ref 0.9–1.3)
GFR AFRICAN AMERICAN: >60 ML/MIN/1.73M2
GFR NON-AFRICAN AMERICAN: >60 ML/MIN/1.73M2
GLUCOSE BLD-MCNC: 106 MG/DL (ref 70–99)
HDLC SERPL-MCNC: 38 MG/DL
LDL CHOLESTEROL DIRECT: 51 MG/DL
POTASSIUM SERPL-SCNC: 4.6 MMOL/L (ref 3.5–5.1)
SODIUM BLD-SCNC: 135 MMOL/L (ref 135–145)
TOTAL PROTEIN: 6.6 GM/DL (ref 6.4–8.2)
TRIGLYCERIDE, FASTING: 116 MG/DL
TSH HIGH SENSITIVITY: 7.01 UIU/ML (ref 0.27–4.2)

## 2020-06-16 PROCEDURE — 84443 ASSAY THYROID STIM HORMONE: CPT

## 2020-06-16 PROCEDURE — 80053 COMPREHEN METABOLIC PANEL: CPT

## 2020-06-16 PROCEDURE — 80061 LIPID PANEL: CPT

## 2020-06-16 PROCEDURE — 36415 COLL VENOUS BLD VENIPUNCTURE: CPT

## 2020-06-22 ENCOUNTER — PROCEDURE VISIT (OUTPATIENT)
Dept: CARDIOLOGY CLINIC | Age: 85
End: 2020-06-22
Payer: MEDICARE

## 2020-06-22 PROCEDURE — 93294 REM INTERROG EVL PM/LDLS PM: CPT | Performed by: INTERNAL MEDICINE

## 2020-06-22 PROCEDURE — 93296 REM INTERROG EVL PM/IDS: CPT | Performed by: INTERNAL MEDICINE

## 2020-07-02 ENCOUNTER — HOSPITAL ENCOUNTER (OUTPATIENT)
Age: 85
Discharge: HOME | End: 2020-07-02
Payer: MEDICARE

## 2020-07-02 LAB
INR BLD: 1.45 INDEX
PROTHROMBIN TIME: 16.7 SECONDS (ref 11.7–14.5)

## 2020-07-02 PROCEDURE — 36415 COLL VENOUS BLD VENIPUNCTURE: CPT

## 2020-07-02 PROCEDURE — 85610 PROTHROMBIN TIME: CPT

## 2020-07-13 ENCOUNTER — HOSPITAL ENCOUNTER (OUTPATIENT)
Dept: INFUSION THERAPY | Age: 85
Discharge: HOME | End: 2020-07-13
Payer: MEDICARE

## 2020-07-13 PROCEDURE — 99211 OFF/OP EST MAY X REQ PHY/QHP: CPT

## 2020-07-16 PROBLEM — C79.51 SECONDARY MALIGNANT NEOPLASM OF BONE (HCC): Status: ACTIVE | Noted: 2020-07-16

## 2020-07-16 PROBLEM — C80.0: Status: ACTIVE | Noted: 2020-07-16

## 2020-07-16 PROBLEM — R06.00 DYSPNEA, UNSPECIFIED: Status: ACTIVE | Noted: 2020-07-16

## 2020-07-16 PROBLEM — R63.4 ABNORMAL WEIGHT LOSS: Status: ACTIVE | Noted: 2020-07-16

## 2020-07-20 ENCOUNTER — HOSPITAL ENCOUNTER (OUTPATIENT)
Dept: INFUSION THERAPY | Age: 85
Discharge: HOME | End: 2020-07-20
Payer: MEDICARE

## 2020-07-20 LAB
BASOPHILS ABSOLUTE: 0 K/CU MM
BASOPHILS RELATIVE PERCENT: 0.2 % (ref 0–1)
DIFFERENTIAL TYPE: ABNORMAL
EOSINOPHILS ABSOLUTE: 0.1 K/CU MM
EOSINOPHILS RELATIVE PERCENT: 0.5 % (ref 0–3)
HCT VFR BLD CALC: 30.1 % (ref 42–52)
HEMOGLOBIN: 9.7 GM/DL (ref 13.5–18)
LYMPHOCYTES ABSOLUTE: 0.9 K/CU MM
LYMPHOCYTES RELATIVE PERCENT: 8.1 % (ref 24–44)
MCH RBC QN AUTO: 26 PG (ref 27–31)
MCHC RBC AUTO-ENTMCNC: 32.2 % (ref 32–36)
MCV RBC AUTO: 80.7 FL (ref 78–100)
MONOCYTES ABSOLUTE: 0.9 K/CU MM
MONOCYTES RELATIVE PERCENT: 8.7 % (ref 0–4)
PDW BLD-RTO: 17.4 % (ref 11.7–14.9)
PLATELET # BLD: 289 K/CU MM (ref 140–440)
PMV BLD AUTO: 10.1 FL (ref 7.5–11.1)
RBC # BLD: 3.73 M/CU MM (ref 4.6–6.2)
SEGMENTED NEUTROPHILS ABSOLUTE COUNT: 8.9 K/CU MM
SEGMENTED NEUTROPHILS RELATIVE PERCENT: 82.5 % (ref 36–66)
WBC # BLD: 10.8 K/CU MM (ref 4–10.5)

## 2020-07-20 PROCEDURE — 6370000000 HC RX 637 (ALT 250 FOR IP)

## 2020-07-20 PROCEDURE — 2580000003 HC RX 258: Performed by: INTERNAL MEDICINE

## 2020-07-20 PROCEDURE — 6360000002 HC RX W HCPCS

## 2020-07-20 PROCEDURE — 96415 CHEMO IV INFUSION ADDL HR: CPT

## 2020-07-20 PROCEDURE — 85025 COMPLETE CBC W/AUTO DIFF WBC: CPT

## 2020-07-20 PROCEDURE — 96375 TX/PRO/DX INJ NEW DRUG ADDON: CPT

## 2020-07-20 PROCEDURE — 6360000002 HC RX W HCPCS: Performed by: INTERNAL MEDICINE

## 2020-07-20 PROCEDURE — 96413 CHEMO IV INFUSION 1 HR: CPT

## 2020-07-20 PROCEDURE — 80053 COMPREHEN METABOLIC PANEL: CPT

## 2020-07-20 RX ORDER — ACETAMINOPHEN 325 MG/1
650 TABLET ORAL ONCE
Status: DISCONTINUED | OUTPATIENT
Start: 2020-07-20 | End: 2020-07-21 | Stop reason: HOSPADM

## 2020-07-20 RX ORDER — DIPHENHYDRAMINE HYDROCHLORIDE 50 MG/ML
50 INJECTION INTRAMUSCULAR; INTRAVENOUS ONCE
Status: DISCONTINUED | OUTPATIENT
Start: 2020-07-20 | End: 2020-07-21 | Stop reason: HOSPADM

## 2020-07-20 RX ORDER — DIPHENHYDRAMINE HYDROCHLORIDE 50 MG/ML
INJECTION INTRAMUSCULAR; INTRAVENOUS
Status: DISPENSED
Start: 2020-07-20 | End: 2020-07-20

## 2020-07-20 RX ORDER — ACETAMINOPHEN 325 MG/1
TABLET ORAL
Status: DISPENSED
Start: 2020-07-20 | End: 2020-07-20

## 2020-07-27 ENCOUNTER — OFFICE VISIT (OUTPATIENT)
Dept: CARDIOLOGY CLINIC | Age: 85
End: 2020-07-27
Payer: MEDICARE

## 2020-07-27 VITALS
WEIGHT: 148 LBS | BODY MASS INDEX: 21.19 KG/M2 | DIASTOLIC BLOOD PRESSURE: 64 MMHG | HEIGHT: 70 IN | HEART RATE: 72 BPM | SYSTOLIC BLOOD PRESSURE: 128 MMHG

## 2020-07-27 PROCEDURE — 99214 OFFICE O/P EST MOD 30 MIN: CPT | Performed by: INTERNAL MEDICINE

## 2020-07-27 PROCEDURE — 93000 ELECTROCARDIOGRAM COMPLETE: CPT | Performed by: INTERNAL MEDICINE

## 2020-07-27 RX ORDER — DEXAMETHASONE 2 MG/1
2 TABLET ORAL 2 TIMES DAILY WITH MEALS
COMMUNITY
End: 2020-09-14 | Stop reason: SDUPTHER

## 2020-07-27 NOTE — PATIENT INSTRUCTIONS
Continue current cardiovascular medications which have been reviewed and discussed individually with you. Continue device check as per care link schedule. Appropriate prescriptions if needed on this visit are addressed. After visit summery is provided. Questions answered and patient verbalizes understanding. Follow up in 6months,  sooner if needed. We have discussed Coronavirus precaution including handwashing practice, wiping items touched in public such as gas pumps, door handles, shopping carts, etc. Self monitoring for infection - fever, chills, cough, SOB. Should they develop symptoms they should call PCP office for further instructions.

## 2020-07-27 NOTE — PROGRESS NOTES
Medication Sig Start Date End Date Taking? Authorizing Provider   dexamethasone (DECADRON) 2 MG tablet Take 2 mg by mouth 2 times daily (with meals)   Yes Historical Provider, MD   isosorbide mononitrate (IMDUR) 30 MG extended release tablet TAKE TWO TABLETS BY MOUTH DAILY 3/10/20  Yes Elsie Ramon MD   atenolol (TENORMIN) 50 MG tablet TAKE ONE AND ONE-HALF TABLET BY MOUTH DAILY 3/10/20  Yes Elsie Ramon MD   ramipril (ALTACE) 5 MG capsule TAKE ONE CAPSULE BY MOUTH DAILY 1/23/20  Yes Elsie Ramon MD   pravastatin (PRAVACHOL) 40 MG tablet TAKE ONE TABLET BY MOUTH DAILY 1/16/20  Yes Elsie Ramon MD   ranolazine (RANEXA) 500 MG extended release tablet TAKE ONE TABLET BY MOUTH TWICE A DAY 11/23/19  Yes Elsie Ramon MD   HYDROcodone-acetaminophen (NORCO) 5-325 MG per tablet Take 1 tablet by mouth every 4 hours as needed for Pain. Yes Historical Provider, MD   docusate sodium (COLACE) 100 MG capsule Take 100 mg by mouth as needed for Constipation   Yes Historical Provider, MD   riTUXimab (RITUXAN IV) Infuse intravenously See Admin Instructions Chemotherapy medication   Yes Historical Provider, MD   Zoledronic Acid (ZOMETA IV) Infuse intravenously See Admin Instructions Chemotherapy medication   Yes Historical Provider, MD   hydrochlorothiazide (MICROZIDE) 12.5 MG capsule Take 12.5 mg by mouth every morning  4/26/19  Yes Historical Provider, MD   warfarin (COUMADIN) 5 MG tablet As directed per Dr. Sanju Barr (Dr. Sanju Barr monitors PT/INR). Yes Historical Provider, MD   nitroGLYCERIN (NITROSTAT) 0.4 MG SL tablet Place 1 tablet under the tongue every 5 minutes as needed. 4/8/14  Yes Elsie Ramon MD   Multiple Vitamin (MULTI-VITAMIN PO) Take 1 tablet by mouth daily. Yes Historical Provider, MD   loratadine (CLARITIN) 10 MG tablet Take 10 mg by mouth daily. Yes Historical Provider, MD   aspirin 81 MG EC tablet Take 81 mg by mouth daily.      Yes Historical Provider, MD   omeprazole ([address] [place] [address]) 20 MG capsule Take 20 mg by mouth daily. Yes Historical Provider, MD     Vitals:    07/27/20 1040   BP: 128/64   Pulse: 72   Weight: 148 lb (67.1 kg)   Height: 5' 10\" (1.778 m)      Body mass index is 21.24 kg/m². Wt Readings from Last 3 Encounters:   07/27/20 148 lb (67.1 kg)   03/23/20 160 lb (72.6 kg)   03/20/20 161 lb 6.4 oz (73.2 kg)     Constitutional:  Patient is pleasant elderly male in no apparent distress.  Lost 13 pounds since last visit. Patient walks with a cane to prevent falls. Eyes:  Pupils are equal.  He wears glasses.  No conjunctival pallor noted. NECK: No JVP or thyromegaly. Patient is wearing a facemask. Cardiovascular:  Auscultation: Normal S1 and A3.  4/9 systolic ejection murmur noted in aortic area. Carotids are negative for bruits.    Respiratory:  Respiratory effort is normal.  Breath sounds are clear to auscultation. Extremities:  No edema, clubbing,  Cyanosis, petechiae. SKIN: Warm and well perfused, no pallor or cyanosis.  Excessive ecchymosis noted in the left forearm from chemotherapy infusion IV site. Abdomen:  No masses or tenderness. No organomegaly noted. Neurologic:  Oriented to time, place, and person and non-anxious. Hearing impairment noted and patient is wearing hearing aids. Psychiatric: Normal mood and effect.     EKG today is consistent with ventricular paced rhythm 70 bpm.    CT abdomen chest May 2020 reported  1. New 1.7 x 1.4 cm distal left para-aortic lymph nodes suspicious for lymphoma. 2. No significant interval change of infiltrate soft tissue in the left upper quadrant along the adrenal gland and pancreatic tail as well as along the celiac axis. 3. Overall slight interval decrease in size of a complex mass in the left axilla. The hyperdense component has slightly increased in size and is of uncertain etiology or possibly related to patient positioning. 4. New pathologic mildly displaced fracture involving S1 along the anterior left aspect.  5. New small left pleural effusion. 6. Dilation of the ascending aorta is stable measuring up to 4.5 cm. June 2020 Pacer analysis is reviewed is consistent with normal dual-chamber non-MRI Medtronic pacer function with stable leads and appropriate battery status for the age of the device. Remaining average battery life is 27 months. Device is 100% pacing in the ventricle. Patient is in persistent atrial fibrillation and on warfarin for anticoagulation therapy       LAB REVIEW:  CBC:   Lab Results   Component Value Date    WBC 10.8 07/20/2020    HGB 9.7 07/20/2020    HCT 30.1 07/20/2020     07/20/2020     Lipids:   Lab Results   Component Value Date    CHOL 141 08/02/2016    TRIG 195 (H) 08/02/2016    HDL 38 (L) 06/16/2020    LDLCALC 85 09/09/2014    LDLDIRECT 51 06/16/2020     Renal:   Lab Results   Component Value Date    BUN 19 06/16/2020    CREATININE 1.0 06/16/2020     06/16/2020    K 4.6 06/16/2020     PT/INR:   Lab Results   Component Value Date    INR 1.45 07/02/2020     IMPRESSION and RECOMMENDATIONS:      CAD s/p PTCA -LAD 1992  Clinically stable. Continue risk modification for secondary prevention. Hypertension  Well-controlled on current medications. Continue the same. Cardiac pacemaker  Working well. Has remaining longevity of the 27 months. Continue to monitor as per care link schedule. 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 Persistent atrial fibrillation ([address]  Rate is well controlled and he is anticoagulated. Continue the same. Last INR was subtherapeutic. He follows up with PCP. Lymphoma ([address]  Follows up with oncology and seem to be stable. Recent CT abdomen results are reviewed and discussed with him. Hyperlipidemia  Last LDL was 52 suggesting lipid status is fairly well controlled. Continue current therapy. Continue current cardiovascular medications which have been reviewed and discussed individually with you.  Continue device check as per care link schedule. Appropriate prescriptions if needed on this visit are addressed. After visit summery is provided. Questions answered and patient verbalizes understanding. Follow up in 6months,  sooner if needed. We have discussed Coronavirus precaution including handwashing practice, wiping items touched in public such as gas pumps, door handles, shopping carts, etc. Self monitoring for infection - fever, chills, cough, SOB. Should they develop symptoms they should call PCP office for further instructions. Gokul Esteves MD, 7/27/2020 11:01 AM     Please note this report has been partially produced using speech recognition software and may contain errors related to that system including errors in grammar, punctuation, and spelling, as well as words and phrases that may be inappropriate. If there are any questions or concerns please feel free to contact the dictating provider for clarification.

## 2020-08-04 ENCOUNTER — HOSPITAL ENCOUNTER (OUTPATIENT)
Age: 85
Discharge: HOME | End: 2020-08-04
Payer: MEDICARE

## 2020-08-04 LAB
INR BLD: 1.12 INDEX
PROTHROMBIN TIME: 12.8 SECONDS (ref 11.7–14.5)

## 2020-08-04 PROCEDURE — 85610 PROTHROMBIN TIME: CPT

## 2020-08-04 PROCEDURE — 36415 COLL VENOUS BLD VENIPUNCTURE: CPT

## 2020-08-18 ENCOUNTER — TELEPHONE (OUTPATIENT)
Dept: ONCOLOGY | Age: 85
End: 2020-08-18

## 2020-08-18 NOTE — TELEPHONE ENCOUNTER
Spoke with patient regarding his CT scan on 08.20.2020 at [facility] he is to arr at 1000am. I went over prep with him and he stated understanding.

## 2020-08-20 ENCOUNTER — HOSPITAL ENCOUNTER (OUTPATIENT)
Dept: CT IMAGING | Age: 85
Discharge: HOME | End: 2020-08-20
Payer: MEDICARE

## 2020-08-20 LAB
GFR AFRICAN AMERICAN: >60 ML/MIN/1.73M2
GFR NON-AFRICAN AMERICAN: 60 ML/MIN/1.73M2
POC CREATININE: 1.2 MG/DL (ref 0.9–1.3)

## 2020-08-20 PROCEDURE — 71260 CT THORAX DX C+: CPT

## 2020-08-20 PROCEDURE — 74177 CT ABD & PELVIS W/CONTRAST: CPT

## 2020-08-20 PROCEDURE — 6360000004 HC RX CONTRAST MEDICATION: Performed by: INTERNAL MEDICINE

## 2020-08-20 RX ADMIN — IOPAMIDOL 75 ML: 755 INJECTION, SOLUTION INTRAVENOUS at 13:30

## 2020-08-20 RX ADMIN — IOHEXOL 50 ML: 240 INJECTION, SOLUTION INTRATHECAL; INTRAVASCULAR; INTRAVENOUS; ORAL at 12:15

## 2020-08-24 ENCOUNTER — HOSPITAL ENCOUNTER (OUTPATIENT)
Dept: INFUSION THERAPY | Age: 85
Discharge: HOME | End: 2020-08-24
Payer: MEDICARE

## 2020-08-24 ENCOUNTER — OFFICE VISIT (OUTPATIENT)
Dept: ONCOLOGY | Age: 85
End: 2020-08-24
Payer: MEDICARE

## 2020-08-24 VITALS
TEMPERATURE: 97.3 F | WEIGHT: 156.6 LBS | HEIGHT: 70 IN | HEART RATE: 79 BPM | DIASTOLIC BLOOD PRESSURE: 56 MMHG | RESPIRATION RATE: 16 BRPM | BODY MASS INDEX: 22.42 KG/M2 | OXYGEN SATURATION: 94 % | SYSTOLIC BLOOD PRESSURE: 114 MMHG

## 2020-08-24 DIAGNOSIS — C80.0 DISSEMINATED MALIGNANT NEOPLASM, UNSPECIFIED (HCC): ICD-10-CM

## 2020-08-24 LAB
ALBUMIN SERPL-MCNC: 3.6 GM/DL (ref 3.4–5)
ALP BLD-CCNC: 147 IU/L (ref 40–128)
ALT SERPL-CCNC: 26 U/L (ref 10–40)
ANION GAP SERPL CALCULATED.3IONS-SCNC: 11 MMOL/L (ref 4–16)
AST SERPL-CCNC: 24 IU/L (ref 15–37)
BASOPHILS ABSOLUTE: 0 K/CU MM
BASOPHILS RELATIVE PERCENT: 0.7 % (ref 0–1)
BILIRUB SERPL-MCNC: 0.5 MG/DL (ref 0–1)
BUN BLDV-MCNC: 24 MG/DL (ref 6–23)
CALCIUM SERPL-MCNC: 9.4 MG/DL (ref 8.3–10.6)
CHLORIDE BLD-SCNC: 96 MMOL/L (ref 99–110)
CO2: 24 MMOL/L (ref 21–32)
CREAT SERPL-MCNC: 1.1 MG/DL (ref 0.9–1.3)
DIFFERENTIAL TYPE: ABNORMAL
EOSINOPHILS ABSOLUTE: 0.1 K/CU MM
EOSINOPHILS RELATIVE PERCENT: 1.7 % (ref 0–3)
GFR AFRICAN AMERICAN: >60 ML/MIN/1.73M2
GFR NON-AFRICAN AMERICAN: >60 ML/MIN/1.73M2
GLUCOSE BLD-MCNC: 90 MG/DL (ref 70–99)
HCT VFR BLD CALC: 31.3 % (ref 42–52)
HEMOGLOBIN: 10.4 GM/DL (ref 13.5–18)
LACTATE DEHYDROGENASE: 258 IU/L (ref 120–246)
LYMPHOCYTES ABSOLUTE: 0.6 K/CU MM
LYMPHOCYTES RELATIVE PERCENT: 11.4 % (ref 24–44)
MCH RBC QN AUTO: 27.5 PG (ref 27–31)
MCHC RBC AUTO-ENTMCNC: 33.2 % (ref 32–36)
MCV RBC AUTO: 82.8 FL (ref 78–100)
MONOCYTES ABSOLUTE: 0.6 K/CU MM
MONOCYTES RELATIVE PERCENT: 10.1 % (ref 0–4)
PDW BLD-RTO: 20.4 % (ref 11.7–14.9)
PLATELET # BLD: 234 K/CU MM (ref 140–440)
PMV BLD AUTO: 9 FL (ref 7.5–11.1)
POTASSIUM SERPL-SCNC: 5 MMOL/L (ref 3.5–5.1)
RBC # BLD: 3.78 M/CU MM (ref 4.6–6.2)
SEGMENTED NEUTROPHILS ABSOLUTE COUNT: 4.1 K/CU MM
SEGMENTED NEUTROPHILS RELATIVE PERCENT: 76.1 % (ref 36–66)
SODIUM BLD-SCNC: 131 MMOL/L (ref 135–145)
TOTAL PROTEIN: 5.4 GM/DL (ref 6.4–8.2)
WBC # BLD: 5.4 K/CU MM (ref 4–10.5)

## 2020-08-24 PROCEDURE — 99211 OFF/OP EST MAY X REQ PHY/QHP: CPT

## 2020-08-24 PROCEDURE — 99214 OFFICE O/P EST MOD 30 MIN: CPT | Performed by: NURSE PRACTITIONER

## 2020-08-24 PROCEDURE — 36415 COLL VENOUS BLD VENIPUNCTURE: CPT

## 2020-08-24 PROCEDURE — 85025 COMPLETE CBC W/AUTO DIFF WBC: CPT

## 2020-08-24 PROCEDURE — 83615 LACTATE (LD) (LDH) ENZYME: CPT

## 2020-08-24 PROCEDURE — 80053 COMPREHEN METABOLIC PANEL: CPT

## 2020-08-24 RX ORDER — DEXAMETHASONE 2 MG/1
2 TABLET ORAL
Qty: 30 TABLET | Refills: 0 | Status: SHIPPED | OUTPATIENT
Start: 2020-08-24 | End: 2020-09-23

## 2020-08-24 RX ORDER — DOXYCYCLINE HYCLATE 100 MG/1
CAPSULE ORAL
COMMUNITY
Start: 2020-07-13 | End: 2020-10-08

## 2020-08-24 RX ORDER — IBUPROFEN 400 MG/1
TABLET ORAL
COMMUNITY
Start: 2020-07-06

## 2020-08-24 NOTE — PROGRESS NOTES
MA Rooming Questions  Patient: Qian Raymundo  MRN: [id number]    Date: 8/24/2020        1. Do you have any new issues? yes - much weaker, not eating much         2. Do you need any refills on medications?    no    3. Have you had any imaging done since your last visit? yes - CT, BX    4. Have you been hospitalized or seen in the emergency room since your last visit here?   yes - had BX    5. Did the patient have a depression screening completed today?  No    No data recorded     PHQ-9 Given to (if applicable):               PHQ-9 Score (if applicable):                     [] Positive     []  Negative              Does question #9 need addressed (if applicable)                     [] Yes    []  No               Tita Rey MA

## 2020-08-24 NOTE — PROGRESS NOTES
Patient Name: Esperanza Meléndez  Patient : 3/5/1929  Patient MRN: [id number]     Primary Oncologist: Yadira Arriaga MD  Referring Provider: Nay Jackson MD       Date of Service: 2020      Chief Complaint:   Chief Complaint   Patient presents with    Follow-up    Results     CT, BX        Problem List: Patient Active Problem List:     CHB (complete heart block) ([address]     CAD s/p PTCA -LAD      Hypertension     Cardiac pacemaker     Irregular heart beat     [place] (hard of hearing)     Chest pain     Lymphoma ([address]     Persistent atrial fibrillation     Hyperlipidemia     Disseminated malignant neoplasm, unspecified ([address]     Secondary malignant neoplasm of bone ([address]     Dyspnea, unspecified     Abnormal weight loss        HPI: Margaux Sahu is a pleasant 70-year-old  male patient with 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 Sona Amos with chemotherapy. Unfortunately I do not have any records about that. Initially he noted lump in front of the left ear for 2 or 3 weeks duration with mild tenderness. He was treated with Bactrim with no improvement and the lump became firm. First FNA of the left bilateral parotid gland on 2018 revealed monotonous appearing small lymphoid cell. He had a second FNA on 2018 which revealed CD10 positive B-cell population 34% of sample likely representing follicular lymphoma. FISH study revealed IGH/BCL-2 translocation t (14;18) without evidence of BCL 6 or MYC rearrangement. We went over the NCCN guideline. PET CT scan in May 0622:  Metabolically active disease involving bilateral parotid and cervical nodules, left axillary lymphadenopathy, mass centered at the tail of the pancreas, and multiple foci throughout the skeleton. This could represent the known lymphoma. LDH was slightly elevated. Viral hepatitis serology was negative.  We discussed about observation vs therapy and he opted to active surveillance. CT chest, abdomen and pelvis in October 2018 revealed:  1. Evaluation of the left axillary lymph nodes is difficult for direct comparison due to differences in arm positioning. The dominant left axillary node appears slightly increased in size since May 2018. Other left axillary lymph nodes are also slightly increased. 2. Slight interval increase in size of an infiltrating mass in the left upper quadrant compatible with malignancy. 3. Previously hypermetabolic soft tissue mass along the left intercostal muscles between the left 3rd and 4th ribs has resolved. 4. Aneurysmal dilatation of the ascending aorta up to 4.5 cm. 5. Severe diverticulosis. 6. Ventral hernia containing small bowel. No obstruction. We discussed options of treatment versus active surveillance. We discussed about chemo vs anti CD-20. He decided to go with close surveillance. In May 2019 labs were reviewed. LDH was wnl. He felt more tired and lost weight. Spouse stated he has been eating well. He started dexamethasone 2 mg daily since early May 2019 with improvement of the symptoms. CT chest, abdomen and pelvis in May 2019 showed interval multiple osseous metastases in the thoracic and lumbar spine, Compression fracture at T5, L5 and T12. Interval decrease of left axillary LAD and infiltrating mass in the left upper quadrant, compatible with response. Due to metastatic disease to bone, I offered zometa every 3 months. He started first treatment with Rituxan on July 12, 2019. He received bisphosphonates on July 12, 2019. He completed 4 weekly rituxan on August 2, 2019. Labs in October 2019 were reviewed. LDH was slightly elevated. He has been followed by dermatologist every 6 months. CT chest, abdomen and pelvis in November 2019 showed response. He started maintenance rituxan on January 24, 2020. He had zometa in December 2019; next dose due 3/2020  CT in May 2020 was reviewed.  I made him aware about new paraaortic lymph node. I plan to have follow up CT in 3 months. On July 13, 2020 he came in for follow-up visit. He is due for the next Rituxan maintenance on July 20, 2020. He tolerated maintenance rituxan. No grade III toxicity. He has low appetite and put him low dose remeron. Low dose dexa helps. He has ulcerated skin lesion to right anterior chest wall for several weeks. He has history of skin cancer. I referred to dermatologist. I put empiric doxycycline. Past Medical History  Heart disease, indolent lymphoma involving small bowel, coronary artery disease, GERD, dyslipidemia and hypertension, history of pacemaker. Follicular lymphoma. Surgical History   Procedure: Cholecystectomy and operative cholangiogram (procedure)  Cholecystectomy 1996.: Small bowel resection 2003, prostatectomy 1985. Allergies  Penicillin       Previous Therapies:  Bendamustine/rituximab     Current Therapy:  [No treatment plan]      Interval History:  Presented for scheduled follow up August 24, 2020. He had CT August 20, 2020 showed mixed response as well as new nodule 8 mm RLL. Impression    1. Mixed response to treatment with interval decrease in size of a distal    left para-aortic lymph node, with slight interval increase in size of a more    proximal 1.6 x 1.4 cm left para-aortic lymph node and 0.6 cm superior    mediastinal lymph node.  Stable left axillary adenopathy/mass. 2. Trace bilateral pleural effusions with adjacent atelectasis. 3. New 8 mm right lower lobe pulmonary nodule suspicious for metastatic    disease. 4. Stable pathologic compression deformities/fractures at L1, L5 and S1.    5. Stable 4.3 cm dilation of the ascending aorta.           He reports he has been out of dexamethasone and has lost his appetite and energy. He denied acute pain. No NVD. No fever or chills. No headache or dizziness. Weight is stable. No night sweat. Review of Systems:   \"Per interval history; 135 06/16/2020    K 4.6 06/16/2020    CL 94 (L) 06/16/2020    CO2 24 06/16/2020    BUN 19 06/16/2020    CREATININE 1.2 08/20/2020    GLUCOSE 106 (H) 06/16/2020    CALCIUM 9.9 06/16/2020    PROT 6.6 06/16/2020    LABALBU 3.8 06/16/2020    BILITOT 0.4 06/16/2020    ALKPHOS 113 06/16/2020    AST 24 06/16/2020    ALT 14 06/16/2020    LABGLOM 60 (L) 08/20/2020    GFRAA >60 08/20/2020    POCCA 1.27 03/20/2020    POCGLU 180 (H) 03/20/2020     Lab Results   Component Value Date     01/06/2020     No components found for: LD  Lab Results   Component Value Date    TSHHS 7.010 (H) 06/16/2020       Immunology:  Lab Results   Component Value Date    PROT 6.6 06/16/2020    ALBUMINELP 4.6 03/16/2011    LABALPH 0.2 03/16/2011    LABALPH 0.8 03/16/2011    LABBETA 1.1 03/16/2011    GAMGLOB 0.7 03/16/2011     No results found for: Richerd Broody, LAMBDAUVOL, KLFLCR  Lab Results   Component Value Date    B2M 3.4 (H) 04/17/2018       Coagulation Panel:  Lab Results   Component Value Date    PROTIME 12.8 08/04/2020    INR 1.12 08/04/2020    APTT 33.8 06/05/2019       Anemia Panel:  No results found for: [id number], FOLATE    Tumor Markers:  Lab Results   Component Value Date    PSA 1.73 09/19/2013       Imaging:   Impression    1. Mixed response to treatment with interval decrease in size of a distal    left para-aortic lymph node, with slight interval increase in size of a more    proximal 1.6 x 1.4 cm left para-aortic lymph node and 0.6 cm superior    mediastinal lymph node.  Stable left axillary adenopathy/mass. 2. Trace bilateral pleural effusions with adjacent atelectasis. 3. New 8 mm right lower lobe pulmonary nodule suspicious for metastatic    disease. 4. Stable pathologic compression deformities/fractures at L1, L5 and S1.    5. Stable 4.3 cm dilation of the ascending aorta. Pathology:      Observations:  Performance Status: 1  Symptoms, but ambulatory.  Restricted in physically strenuous activity, but ambulatory and able to carry out work of a light or sedentary nature   Depression Status:   Cognitive Status: Oriented to person, time, and place     Assessment & Plan:  1. He has follicular lymphoma involving left parotid gland Status post FNA biopsy in March and April 2018. Clinically he had stage III disease. PET scan in May 2018 and labs were reviewed. CT chest, abdomen pelvis in October 2018 was reviewed with him and wife. Observation vs systemic chemo were discussed with him and he opted to active surveillance. Labs in May 2019 were reviewed. He started having more symptom in May 2019 and was placed on a trial of steroid. CT chest, abdomen and pelvis in May 2019 showed response to trial of steroid. He has metastatic disease to the bone. He started Rituxan and Zometa on July 12, 2019. He completed 4 weekly Rituxan in August 2019. CT chest, abdomen and pelvis in November 2019 showed response. He started maintenance Rituxan on January 24, 2020. Labs will be obtained same day. So far he tolerated well. CT chest, abdomen and pelvis in May 2020 were reviewed. He will continue with maintenance rituxan. Follow up CT Chest/Abdomen showed mixed response with new RLL nodule. We will repeat in three months. 2. We discussed about healthy diet and life style. He will continue with Zometa; No signs of ONJ; next dose due 9/14/2020    3. I put him on low dose remeron to improve his appetite. He may take low dose dexa. I advise to eat frequent meals. Dexamethasone refilled today. 4. I refer to dermatologist for ulcerated skin lesion to right anterior chest wall. He has history of skin cancer. Biopsy of chest lesion by dermatology. He will follow up for results. RTC in 6 weeks or sooner. All of his questions have been answered for today. Recent imaging and labs were reviewed and discussed with the patient.       Electronically signed by SUKUMAR Aguillon CNP on 8/24/20 at 9:11 AM ANTELMO

## 2020-09-03 ENCOUNTER — HOSPITAL ENCOUNTER (OUTPATIENT)
Age: 85
Discharge: HOME | End: 2020-09-03
Payer: MEDICARE

## 2020-09-03 LAB
INR BLD: 1.95 INDEX
PROTHROMBIN TIME: 22.5 SECONDS (ref 11.7–14.5)

## 2020-09-03 PROCEDURE — 85610 PROTHROMBIN TIME: CPT

## 2020-09-03 PROCEDURE — 36415 COLL VENOUS BLD VENIPUNCTURE: CPT

## 2020-09-08 RX ORDER — RANOLAZINE 500 MG/1
TABLET, EXTENDED RELEASE ORAL
Qty: 180 TABLET | Refills: 3 | Status: SHIPPED | OUTPATIENT
Start: 2020-09-08

## 2020-09-11 RX ORDER — SODIUM CHLORIDE 0.9 % (FLUSH) 0.9 %
10 SYRINGE (ML) INJECTION PRN
Status: CANCELLED | OUTPATIENT
Start: 2020-09-14

## 2020-09-11 RX ORDER — MEPERIDINE HYDROCHLORIDE 50 MG/ML
12.5 INJECTION INTRAMUSCULAR; INTRAVENOUS; SUBCUTANEOUS ONCE
Status: CANCELLED | OUTPATIENT
Start: 2020-09-14

## 2020-09-11 RX ORDER — DIPHENHYDRAMINE HYDROCHLORIDE 50 MG/ML
50 INJECTION INTRAMUSCULAR; INTRAVENOUS ONCE
Status: CANCELLED | OUTPATIENT
Start: 2020-11-09

## 2020-09-11 RX ORDER — ACETAMINOPHEN 325 MG/1
650 TABLET ORAL ONCE
Status: CANCELLED | OUTPATIENT
Start: 2021-03-01

## 2020-09-11 RX ORDER — SODIUM CHLORIDE 9 MG/ML
INJECTION, SOLUTION INTRAVENOUS CONTINUOUS
Status: CANCELLED | OUTPATIENT
Start: 2020-09-14

## 2020-09-11 RX ORDER — MEPERIDINE HYDROCHLORIDE 50 MG/ML
12.5 INJECTION INTRAMUSCULAR; INTRAVENOUS; SUBCUTANEOUS ONCE
Status: CANCELLED | OUTPATIENT
Start: 2021-01-04

## 2020-09-11 RX ORDER — SODIUM CHLORIDE 9 MG/ML
20 INJECTION, SOLUTION INTRAVENOUS ONCE
Status: CANCELLED | OUTPATIENT
Start: 2020-11-09

## 2020-09-11 RX ORDER — ACETAMINOPHEN 325 MG/1
650 TABLET ORAL ONCE
Status: CANCELLED | OUTPATIENT
Start: 2020-09-14

## 2020-09-11 RX ORDER — HEPARIN SODIUM (PORCINE) LOCK FLUSH IV SOLN 100 UNIT/ML 100 UNIT/ML
500 SOLUTION INTRAVENOUS PRN
Status: CANCELLED | OUTPATIENT
Start: 2021-01-04

## 2020-09-11 RX ORDER — SODIUM CHLORIDE 9 MG/ML
INJECTION, SOLUTION INTRAVENOUS CONTINUOUS
Status: CANCELLED | OUTPATIENT
Start: 2021-03-01

## 2020-09-11 RX ORDER — SODIUM CHLORIDE 9 MG/ML
INJECTION, SOLUTION INTRAVENOUS CONTINUOUS
Status: CANCELLED | OUTPATIENT
Start: 2021-01-04

## 2020-09-11 RX ORDER — MEPERIDINE HYDROCHLORIDE 50 MG/ML
12.5 INJECTION INTRAMUSCULAR; INTRAVENOUS; SUBCUTANEOUS ONCE
Status: CANCELLED | OUTPATIENT
Start: 2021-03-01

## 2020-09-11 RX ORDER — SODIUM CHLORIDE 0.9 % (FLUSH) 0.9 %
10 SYRINGE (ML) INJECTION PRN
Status: CANCELLED | OUTPATIENT
Start: 2021-01-04

## 2020-09-11 RX ORDER — SODIUM CHLORIDE 9 MG/ML
20 INJECTION, SOLUTION INTRAVENOUS ONCE
Status: CANCELLED | OUTPATIENT
Start: 2021-03-01

## 2020-09-11 RX ORDER — METHYLPREDNISOLONE SODIUM SUCCINATE 125 MG/2ML
125 INJECTION, POWDER, LYOPHILIZED, FOR SOLUTION INTRAMUSCULAR; INTRAVENOUS ONCE
Status: CANCELLED | OUTPATIENT
Start: 2021-03-01

## 2020-09-11 RX ORDER — ACETAMINOPHEN 325 MG/1
650 TABLET ORAL ONCE
Status: CANCELLED | OUTPATIENT
Start: 2020-11-09

## 2020-09-11 RX ORDER — DIPHENHYDRAMINE HYDROCHLORIDE 50 MG/ML
25 INJECTION INTRAMUSCULAR; INTRAVENOUS ONCE
Status: CANCELLED | OUTPATIENT
Start: 2020-11-09

## 2020-09-11 RX ORDER — DIPHENHYDRAMINE HYDROCHLORIDE 50 MG/ML
50 INJECTION INTRAMUSCULAR; INTRAVENOUS ONCE
Status: CANCELLED | OUTPATIENT
Start: 2021-01-04

## 2020-09-11 RX ORDER — DIPHENHYDRAMINE HYDROCHLORIDE 50 MG/ML
50 INJECTION INTRAMUSCULAR; INTRAVENOUS ONCE
Status: CANCELLED | OUTPATIENT
Start: 2021-03-01

## 2020-09-11 RX ORDER — DIPHENHYDRAMINE HYDROCHLORIDE 50 MG/ML
25 INJECTION INTRAMUSCULAR; INTRAVENOUS ONCE
Status: CANCELLED | OUTPATIENT
Start: 2021-03-01

## 2020-09-11 RX ORDER — DIPHENHYDRAMINE HYDROCHLORIDE 50 MG/ML
25 INJECTION INTRAMUSCULAR; INTRAVENOUS ONCE
Status: CANCELLED | OUTPATIENT
Start: 2020-09-14

## 2020-09-11 RX ORDER — EPINEPHRINE 1 MG/ML
0.3 INJECTION, SOLUTION, CONCENTRATE INTRAVENOUS PRN
Status: CANCELLED | OUTPATIENT
Start: 2020-11-09

## 2020-09-11 RX ORDER — DIPHENHYDRAMINE HYDROCHLORIDE 50 MG/ML
25 INJECTION INTRAMUSCULAR; INTRAVENOUS ONCE
Status: CANCELLED | OUTPATIENT
Start: 2021-01-04

## 2020-09-11 RX ORDER — SODIUM CHLORIDE 9 MG/ML
INJECTION, SOLUTION INTRAVENOUS CONTINUOUS
Status: CANCELLED | OUTPATIENT
Start: 2020-11-09

## 2020-09-11 RX ORDER — SODIUM CHLORIDE 0.9 % (FLUSH) 0.9 %
5 SYRINGE (ML) INJECTION PRN
Status: CANCELLED | OUTPATIENT
Start: 2020-09-14

## 2020-09-11 RX ORDER — HEPARIN SODIUM (PORCINE) LOCK FLUSH IV SOLN 100 UNIT/ML 100 UNIT/ML
500 SOLUTION INTRAVENOUS PRN
Status: CANCELLED | OUTPATIENT
Start: 2020-09-14

## 2020-09-11 RX ORDER — MEPERIDINE HYDROCHLORIDE 50 MG/ML
12.5 INJECTION INTRAMUSCULAR; INTRAVENOUS; SUBCUTANEOUS ONCE
Status: CANCELLED | OUTPATIENT
Start: 2020-11-09

## 2020-09-11 RX ORDER — ACETAMINOPHEN 325 MG/1
650 TABLET ORAL ONCE
Status: CANCELLED | OUTPATIENT
Start: 2021-01-04

## 2020-09-11 RX ORDER — METHYLPREDNISOLONE SODIUM SUCCINATE 125 MG/2ML
125 INJECTION, POWDER, LYOPHILIZED, FOR SOLUTION INTRAMUSCULAR; INTRAVENOUS ONCE
Status: CANCELLED | OUTPATIENT
Start: 2020-11-09

## 2020-09-11 RX ORDER — EPINEPHRINE 1 MG/ML
0.3 INJECTION, SOLUTION, CONCENTRATE INTRAVENOUS PRN
Status: CANCELLED | OUTPATIENT
Start: 2021-03-01

## 2020-09-11 RX ORDER — METHYLPREDNISOLONE SODIUM SUCCINATE 125 MG/2ML
125 INJECTION, POWDER, LYOPHILIZED, FOR SOLUTION INTRAMUSCULAR; INTRAVENOUS ONCE
Status: CANCELLED | OUTPATIENT
Start: 2020-09-14

## 2020-09-11 RX ORDER — HEPARIN SODIUM (PORCINE) LOCK FLUSH IV SOLN 100 UNIT/ML 100 UNIT/ML
500 SOLUTION INTRAVENOUS PRN
Status: CANCELLED | OUTPATIENT
Start: 2020-11-09

## 2020-09-11 RX ORDER — HEPARIN SODIUM (PORCINE) LOCK FLUSH IV SOLN 100 UNIT/ML 100 UNIT/ML
500 SOLUTION INTRAVENOUS PRN
Status: CANCELLED | OUTPATIENT
Start: 2021-03-01

## 2020-09-11 RX ORDER — EPINEPHRINE 1 MG/ML
0.3 INJECTION, SOLUTION, CONCENTRATE INTRAVENOUS PRN
Status: CANCELLED | OUTPATIENT
Start: 2020-09-14

## 2020-09-11 RX ORDER — SODIUM CHLORIDE 9 MG/ML
20 INJECTION, SOLUTION INTRAVENOUS ONCE
Status: CANCELLED | OUTPATIENT
Start: 2020-09-14

## 2020-09-11 RX ORDER — SODIUM CHLORIDE 0.9 % (FLUSH) 0.9 %
5 SYRINGE (ML) INJECTION PRN
Status: CANCELLED | OUTPATIENT
Start: 2021-03-01

## 2020-09-11 RX ORDER — METHYLPREDNISOLONE SODIUM SUCCINATE 125 MG/2ML
125 INJECTION, POWDER, LYOPHILIZED, FOR SOLUTION INTRAMUSCULAR; INTRAVENOUS ONCE
Status: CANCELLED | OUTPATIENT
Start: 2021-01-04

## 2020-09-11 RX ORDER — SODIUM CHLORIDE 0.9 % (FLUSH) 0.9 %
5 SYRINGE (ML) INJECTION PRN
Status: CANCELLED | OUTPATIENT
Start: 2020-11-09

## 2020-09-11 RX ORDER — SODIUM CHLORIDE 0.9 % (FLUSH) 0.9 %
10 SYRINGE (ML) INJECTION PRN
Status: CANCELLED | OUTPATIENT
Start: 2020-11-09

## 2020-09-11 RX ORDER — DIPHENHYDRAMINE HYDROCHLORIDE 50 MG/ML
50 INJECTION INTRAMUSCULAR; INTRAVENOUS ONCE
Status: CANCELLED | OUTPATIENT
Start: 2020-09-14

## 2020-09-11 RX ORDER — SODIUM CHLORIDE 9 MG/ML
20 INJECTION, SOLUTION INTRAVENOUS ONCE
Status: CANCELLED | OUTPATIENT
Start: 2021-01-04

## 2020-09-11 RX ORDER — SODIUM CHLORIDE 0.9 % (FLUSH) 0.9 %
10 SYRINGE (ML) INJECTION PRN
Status: CANCELLED | OUTPATIENT
Start: 2021-03-01

## 2020-09-11 RX ORDER — SODIUM CHLORIDE 0.9 % (FLUSH) 0.9 %
5 SYRINGE (ML) INJECTION PRN
Status: CANCELLED | OUTPATIENT
Start: 2021-01-04

## 2020-09-11 RX ORDER — EPINEPHRINE 1 MG/ML
0.3 INJECTION, SOLUTION, CONCENTRATE INTRAVENOUS PRN
Status: CANCELLED | OUTPATIENT
Start: 2021-01-04

## 2020-09-14 ENCOUNTER — HOSPITAL ENCOUNTER (OUTPATIENT)
Dept: INFUSION THERAPY | Age: 85
Discharge: HOME | End: 2020-09-14
Payer: MEDICARE

## 2020-09-14 VITALS
OXYGEN SATURATION: 93 % | BODY MASS INDEX: 23.08 KG/M2 | HEART RATE: 66 BPM | RESPIRATION RATE: 16 BRPM | DIASTOLIC BLOOD PRESSURE: 82 MMHG | TEMPERATURE: 97.4 F | HEIGHT: 69 IN | SYSTOLIC BLOOD PRESSURE: 151 MMHG | WEIGHT: 155.8 LBS

## 2020-09-14 DIAGNOSIS — C79.51 SECONDARY MALIGNANT NEOPLASM OF BONE (HCC): ICD-10-CM

## 2020-09-14 DIAGNOSIS — C82.01 GRADE 1 FOLLICULAR LYMPHOMA OF LYMPH NODES OF NECK (HCC): Primary | ICD-10-CM

## 2020-09-14 DIAGNOSIS — C81.00 NODULAR LYMPHOCYTE PREDOMINANT HODGKIN LYMPHOMA, UNSPECIFIED BODY REGION (HCC): ICD-10-CM

## 2020-09-14 LAB
ALBUMIN SERPL-MCNC: 4.2 GM/DL (ref 3.4–5)
ALP BLD-CCNC: 111 IU/L (ref 40–128)
ALT SERPL-CCNC: 24 U/L (ref 10–40)
ANION GAP SERPL CALCULATED.3IONS-SCNC: 13 MMOL/L (ref 4–16)
AST SERPL-CCNC: 26 IU/L (ref 15–37)
BASOPHILS ABSOLUTE: 0 K/CU MM
BASOPHILS RELATIVE PERCENT: 0.1 % (ref 0–1)
BILIRUB SERPL-MCNC: 0.4 MG/DL (ref 0–1)
BUN BLDV-MCNC: 26 MG/DL (ref 6–23)
CALCIUM SERPL-MCNC: 9.4 MG/DL (ref 8.3–10.6)
CHLORIDE BLD-SCNC: 98 MMOL/L (ref 99–110)
CO2: 25 MMOL/L (ref 21–32)
CREAT SERPL-MCNC: 1 MG/DL (ref 0.9–1.3)
DIFFERENTIAL TYPE: ABNORMAL
EOSINOPHILS ABSOLUTE: 0.1 K/CU MM
EOSINOPHILS RELATIVE PERCENT: 0.7 % (ref 0–3)
GFR AFRICAN AMERICAN: >60 ML/MIN/1.73M2
GFR AFRICAN AMERICAN: >60 ML/MIN/1.73M2
GFR NON-AFRICAN AMERICAN: >60 ML/MIN/1.73M2
GFR NON-AFRICAN AMERICAN: >60 ML/MIN/1.73M2
GLUCOSE BLD-MCNC: 169 MG/DL (ref 70–99)
GLUCOSE BLD-MCNC: 175 MG/DL (ref 70–99)
HCT VFR BLD CALC: 37.8 % (ref 42–52)
HEMOGLOBIN: 12.3 GM/DL (ref 13.5–18)
LYMPHOCYTES ABSOLUTE: 0.6 K/CU MM
LYMPHOCYTES RELATIVE PERCENT: 8.4 % (ref 24–44)
MCH RBC QN AUTO: 28 PG (ref 27–31)
MCHC RBC AUTO-ENTMCNC: 32.5 % (ref 32–36)
MCV RBC AUTO: 86.1 FL (ref 78–100)
MONOCYTES ABSOLUTE: 0.5 K/CU MM
MONOCYTES RELATIVE PERCENT: 7.2 % (ref 0–4)
PDW BLD-RTO: 20.8 % (ref 11.7–14.9)
PLATELET # BLD: 168 K/CU MM (ref 140–440)
PMV BLD AUTO: 9.5 FL (ref 7.5–11.1)
POC CALCIUM: 1.25 MMOL/L (ref 1.12–1.32)
POC CHLORIDE: 102 MMOL/L (ref 98–109)
POC CREATININE: 1.1 MG/DL (ref 0.9–1.3)
POTASSIUM SERPL-SCNC: 4 MMOL/L (ref 3.6–5.1)
POTASSIUM SERPL-SCNC: 4.2 MMOL/L (ref 3.5–5.1)
RBC # BLD: 4.39 M/CU MM (ref 4.6–6.2)
SEGMENTED NEUTROPHILS ABSOLUTE COUNT: 6.1 K/CU MM
SEGMENTED NEUTROPHILS RELATIVE PERCENT: 83.6 % (ref 36–66)
SODIUM BLD-SCNC: 135 MMOL/L (ref 136–145)
SODIUM BLD-SCNC: 136 MMOL/L (ref 135–145)
SOURCE, BLOOD GAS: ABNORMAL
TOTAL PROTEIN: 6 GM/DL (ref 6.4–8.2)
WBC # BLD: 7.3 K/CU MM (ref 4–10.5)

## 2020-09-14 PROCEDURE — 96374 THER/PROPH/DIAG INJ IV PUSH: CPT

## 2020-09-14 PROCEDURE — 6360000002 HC RX W HCPCS: Performed by: INTERNAL MEDICINE

## 2020-09-14 PROCEDURE — 96415 CHEMO IV INFUSION ADDL HR: CPT

## 2020-09-14 PROCEDURE — 96375 TX/PRO/DX INJ NEW DRUG ADDON: CPT

## 2020-09-14 PROCEDURE — 6370000000 HC RX 637 (ALT 250 FOR IP): Performed by: INTERNAL MEDICINE

## 2020-09-14 PROCEDURE — 96413 CHEMO IV INFUSION 1 HR: CPT

## 2020-09-14 PROCEDURE — 85025 COMPLETE CBC W/AUTO DIFF WBC: CPT

## 2020-09-14 PROCEDURE — 2580000003 HC RX 258: Performed by: INTERNAL MEDICINE

## 2020-09-14 PROCEDURE — 80053 COMPREHEN METABOLIC PANEL: CPT

## 2020-09-14 RX ORDER — SODIUM CHLORIDE 9 MG/ML
20 INJECTION, SOLUTION INTRAVENOUS ONCE
Status: DISCONTINUED | OUTPATIENT
Start: 2020-09-14 | End: 2020-09-15 | Stop reason: HOSPADM

## 2020-09-14 RX ORDER — ACETAMINOPHEN 325 MG/1
650 TABLET ORAL ONCE
Status: COMPLETED | OUTPATIENT
Start: 2020-09-14 | End: 2020-09-14

## 2020-09-14 RX ORDER — DIPHENHYDRAMINE HYDROCHLORIDE 50 MG/ML
25 INJECTION INTRAMUSCULAR; INTRAVENOUS ONCE
Status: COMPLETED | OUTPATIENT
Start: 2020-09-14 | End: 2020-09-14

## 2020-09-14 RX ORDER — DEXAMETHASONE 2 MG/1
2 TABLET ORAL 2 TIMES DAILY WITH MEALS
Qty: 30 TABLET | Refills: 1 | Status: SHIPPED | OUTPATIENT
Start: 2020-09-14 | End: 2020-10-05 | Stop reason: SDUPTHER

## 2020-09-14 RX ADMIN — ZOLEDRONIC ACID 3.3 MG: 0.8 INJECTION, SOLUTION, CONCENTRATE INTRAVENOUS at 09:30

## 2020-09-14 RX ADMIN — SODIUM CHLORIDE 20 ML/HR: 9 INJECTION, SOLUTION INTRAVENOUS at 09:01

## 2020-09-14 RX ADMIN — ACETAMINOPHEN 650 MG: 325 TABLET ORAL at 09:02

## 2020-09-14 RX ADMIN — DIPHENHYDRAMINE HYDROCHLORIDE 25 MG: 50 INJECTION INTRAMUSCULAR; INTRAVENOUS at 09:02

## 2020-09-14 RX ADMIN — RITUXIMAB 700 MG: 10 INJECTION, SOLUTION INTRAVENOUS at 09:53

## 2020-09-14 ASSESSMENT — PAIN SCALES - GENERAL: PAINLEVEL_OUTOF10: 0

## 2020-09-27 ENCOUNTER — PROCEDURE VISIT (OUTPATIENT)
Dept: CARDIOLOGY CLINIC | Age: 85
End: 2020-09-27
Payer: MEDICARE

## 2020-09-27 PROCEDURE — 93294 REM INTERROG EVL PM/LDLS PM: CPT | Performed by: INTERNAL MEDICINE

## 2020-09-27 PROCEDURE — 93296 REM INTERROG EVL PM/IDS: CPT | Performed by: INTERNAL MEDICINE

## 2020-09-28 ENCOUNTER — TELEPHONE (OUTPATIENT)
Dept: CARDIOLOGY CLINIC | Age: 85
End: 2020-09-28

## 2020-09-28 NOTE — PROGRESS NOTES
Patient Name: John Garcia  Patient : 3/5/1929  Patient MRN: [id number]     Primary Oncologist: Cadence Leary MD  Referring Provider: Vangie Valdez MD       Date of Service: 2020      Chief Complaint:   No chief complaint on file. He came in for follow-up visit. Problem List: Patient Active Problem List:     CHB (complete heart block) (H[facility])     CAD s/p PTCA -LAD      Hypertension     Cardiac pacemaker     Irregular heart beat     [place] (hard of hearing)     Chest pain     Lymphoma ([address]     Persistent atrial fibrillation     Hyperlipidemia     Disseminated malignant neoplasm, unspecified ([address]     Secondary malignant neoplasm of bone (HCC)     Dyspnea, unspecified     Abnormal weight loss        HPI:   Mr. 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 Ashley Jennings with chemotherapy. Unfortunately I do not have any records about that. Initially he noted lump in front of the left ear for 2 or 3 weeks duration with mild tenderness. He was treated with Bactrim with no improvement and the lump became firm. First FNA of the left bilateral parotid gland on 2018 revealed monotonous appearing small lymphoid cell. He had a second FNA on 2018 which revealed CD10 positive B-cell population 34% of sample likely representing follicular lymphoma. FISH study revealed IGH/BCL-2 translocation t (14;18) without evidence of BCL 6 or MYC rearrangement. We went over the NCCN guideline. PET CT scan in May 2998:  Metabolically active disease involving bilateral parotid and cervical nodules, left axillary lymphadenopathy, mass centered at the tail of the pancreas, and multiple foci throughout the skeleton. This could represent the known lymphoma. LDH was slightly elevated. Viral hepatitis serology was negative.  We discussed about observation vs therapy and he opted to active surveillance. CT chest, abdomen and pelvis in October 2018 revealed:  1. Evaluation of the left axillary lymph nodes is difficult for direct comparison due to differences in arm positioning. The dominant left axillary node appears slightly increased in size since May 2018. Other left axillary lymph nodes are also slightly increased. 2. Slight interval increase in size of an infiltrating mass in the left upper quadrant compatible with malignancy. 3. Previously hypermetabolic soft tissue mass along the left intercostal muscles between the left 3rd and 4th ribs has resolved. 4. Aneurysmal dilatation of the ascending aorta up to 4.5 cm. 5. Severe diverticulosis. 6. Ventral hernia containing small bowel. No obstruction. We discussed options of treatment versus active surveillance. We discussed about chemo vs anti CD-20. He decided to go with close surveillance. In May 2019 labs were reviewed. LDH was wnl. He felt more tired and lost weight. Spouse stated he has been eating well. He started dexamethasone 2 mg daily since early May 2019 with improvement of the symptoms. CT chest, abdomen and pelvis in May 2019 showed interval multiple osseous metastases in the thoracic and lumbar spine, Compression fracture at T5, L5 and T12. Interval decrease of left axillary LAD and infiltrating mass in the left upper quadrant, compatible with response. Due to metastatic disease to bone, I offered zometa every 3 months. He started first treatment with Rituxan on July 12, 2019. He received bisphosphonates on July 12, 2019. He completed 4 weekly rituxan on August 2, 2019. Labs in October 2019 were reviewed. LDH was slightly elevated. He has been followed by dermatologist every 6 months. CT chest, abdomen and pelvis in November 2019 showed response. He started maintenance rituxan on January 24, 2020. He had zometa in December 2019; next dose due 3/2020  CT in May 2020 was reviewed.  I made him aware about new paraaortic lymph node. I plan to have follow up CT in 3 months. On October 5, 2020 he came in for follow-up visit. He is agreeable to continue with maintenance Rituxan in November 2020. I ordered follow-up imaging study prior to next office visit. I recommend he is doing physical therapy. Spouse will get lift chair for him. Graylon Edmond He tolerated maintenance rituxan. No grade III toxicity. He denies any acute pain. No nausea, vomiting or diarrhea. No dysuria hematuria. He feels tired easily. Past Medical History  Heart disease, indolent lymphoma involving small bowel, coronary artery disease, GERD, dyslipidemia and hypertension, history of pacemaker. Follicular lymphoma. Surgical History   Cholecystectomy and operative cholangiogram  Cholecystectomy 1996.: Small bowel resection 2003, prostatectomy 1985. Allergies  Reviewed as per chart. Medication  Reviewed as per chart. Social History  Smoking Status Never smoker  He denies any alcohol or illicit drug use. He has 2 children. Family History   Mother: Brain/CNS cancer   Brother 1: Lung cancer  Mother had brain cancer. 2 brothers had lung cancer. Previous Therapies:  Bendamustine/rituximab     Current Therapy:  OP Follicular Lymphoma (Grade 1- 2): RiTUXimab Maintenance, 8-Week Cycle  OP Zoledronic Acid (Zometa) Every 12 Weeks        CT August 20, 2020 showed mixed response as well as new nodule 8 mm RLL. Impression    1. Mixed response to treatment with interval decrease in size of a distal    left para-aortic lymph node, with slight interval increase in size of a more    proximal 1.6 x 1.4 cm left para-aortic lymph node and 0.6 cm superior    mediastinal lymph node.  Stable left axillary adenopathy/mass. 2. Trace bilateral pleural effusions with adjacent atelectasis. 3. New 8 mm right lower lobe pulmonary nodule suspicious for metastatic    disease.     4. Stable pathologic compression deformities/fractures at L1, L5 and S1.    5. Stable 4.3 cm dilation of the ascending aorta.               Review of Systems: \"Per interval history; otherwise 10 point ROS is negative. \"     Vital Signs: There were no vitals taken for this visit. Physical Exam:    CONSTITUTIONAL: awake, alert, cooperative, no apparent distress  + walker  EYES: pupils equal, round and reactive to light, sclera clear and conjunctiva normal  ENT: Normocephalic, without obvious abnormality, atraumatic  NECK: supple, symmetrical, no jugular venous distension and no carotid bruits   HEMATOLOGIC/LYMPHATIC: no cervical, supraclavicular or axillary lymphadenopathy   LUNGS: no increased work of breathing and clear to auscultation   CARDIOVASCULAR: regular rate and rhythm, normal S1 and S2, no murmur noted  ABDOMEN: normal bowel sounds x 4, soft, non-distended, non-tender, no masses palpated, no hepatosplenomegaly   MUSCULOSKELETAL: full range of motion noted, tone is normal  NEUROLOGIC: awake, alert, oriented to name, place and time. Cranial nerves II through XII grossly intact. SKIN: No rash. Warm and dry skin. EXTREMITIES: no LE edema or cyanosis.     Labs:    Hematology:  Lab Results   Component Value Date    WBC 7.3 09/14/2020    RBC 4.39 (L) 09/14/2020    HGB 12.3 (L) 09/14/2020    HCT 37.8 (L) 09/14/2020    MCV 86.1 09/14/2020    MCH 28.0 09/14/2020    MCHC 32.5 09/14/2020    RDW 20.8 (H) 09/14/2020     09/14/2020    MPV 9.5 09/14/2020    SEGSPCT 83.6 (H) 09/14/2020    EOSRELPCT 0.7 09/14/2020    BASOPCT 0.1 09/14/2020    LYMPHOPCT 8.4 (L) 09/14/2020    MONOPCT 7.2 (H) 09/14/2020    SEGSABS 6.1 09/14/2020    EOSABS 0.1 09/14/2020    BASOSABS 0.0 09/14/2020    LYMPHSABS 0.6 09/14/2020    MONOSABS 0.5 09/14/2020    DIFFTYPE AUTOMATED DIFFERENTIAL 09/14/2020     Lab Results   Component Value Date    ESR 4 03/16/2011     Chemistry:  Lab Results   Component Value Date     (L) 09/14/2020    K 4.0 09/14/2020    CL 98 (L) 09/14/2020    CO2 25 09/14/2020 BUN 26 (H) 09/14/2020    CREATININE 1.1 09/14/2020    GLUCOSE 169 (H) 09/14/2020    CALCIUM 9.4 09/14/2020    PROT 6.0 (L) 09/14/2020    LABALBU 4.2 09/14/2020    BILITOT 0.4 09/14/2020    ALKPHOS 111 09/14/2020    AST 26 09/14/2020    ALT 24 09/14/2020    LABGLOM >60 09/14/2020    GFRAA >60 09/14/2020    POCCA 1.25 09/14/2020    POCGLU 175 (H) 09/14/2020     Lab Results   Component Value Date     (H) 08/24/2020     Lab Results   Component Value Date    TSHHS 7.010 (H) 06/16/2020     Immunology:  Lab Results   Component Value Date    PROT 6.0 (L) 09/14/2020    ALBUMINELP 4.6 03/16/2011    LABALPH 0.2 03/16/2011    LABALPH 0.8 03/16/2011    LABBETA 1.1 03/16/2011    GAMGLOB 0.7 03/16/2011     Lab Results   Component Value Date    B2M 3.4 (H) 04/17/2018     Coagulation Panel:  Lab Results   Component Value Date    PROTIME 22.5 (H) 09/03/2020    INR 1.95 09/03/2020    APTT 33.8 06/05/2019     Tumor Markers:  Lab Results   Component Value Date    PSA 1.73 09/19/2013     Imaging:   Impression    1. Mixed response to treatment with interval decrease in size of a distal    left para-aortic lymph node, with slight interval increase in size of a more    proximal 1.6 x 1.4 cm left para-aortic lymph node and 0.6 cm superior    mediastinal lymph node.  Stable left axillary adenopathy/mass. 2. Trace bilateral pleural effusions with adjacent atelectasis. 3. New 8 mm right lower lobe pulmonary nodule suspicious for metastatic    disease. 4. Stable pathologic compression deformities/fractures at L1, L5 and S1.    5. Stable 4.3 cm dilation of the ascending aorta. Assessment & Plan:  1. He has follicular lymphoma involving left parotid gland Status post FNA biopsy in March and April 2018. Clinically he had stage III disease. PET scan in May 2018 and labs were reviewed. CT chest, abdomen pelvis in October 2018 was reviewed with him and wife.   Observation vs systemic chemo were discussed with him and he opted to active surveillance. Labs in May 2019 were reviewed. He started having more symptom in May 2019 and was placed on a trial of steroid. CT chest, abdomen and pelvis in May 2019 showed response to trial of steroid. He has metastatic disease to the bone. He started Rituxan and Zometa on July 12, 2019. He completed 4 weekly Rituxan in August 2019. CT chest, abdomen and pelvis in November 2019 showed response. He started maintenance Rituxan on January 24, 2020. Labs will be obtained same day. CT chest, abdomen and pelvis in May 2020 were reviewed. Follow up CT Chest/Abdomen showed mixed response with new RLL nodule. He is agreeable to continue with maintenance Rituxan due in November 2020. I scheduled follow-up CT chest abdomen pelvis prior to next office visit. 2. We discussed about healthy diet and life style. He will continue with Zometa; No signs of ONJ; next dose due 9/14/2020    3. I put him on low dose remeron to improve his appetite. He may take low dose dexa. I advise to eat frequent meals. Dexamethasone refilled today. 4. I refer to dermatologist for ulcerated skin lesion to right anterior chest wall. He has history of skin cancer. Biopsy of chest lesion by dermatology. He will follow up for results. RTC in 5 weeks or sooner. All of his questions have been answered for today. Recent imaging and labs were reviewed and discussed with the patient.       Electronically signed by SUKUMAR Vasques CNP on 8/24/20 at 9:11 AM BRITTONT

## 2020-10-05 ENCOUNTER — OFFICE VISIT (OUTPATIENT)
Dept: ONCOLOGY | Age: 85
End: 2020-10-05
Payer: MEDICARE

## 2020-10-05 ENCOUNTER — HOSPITAL ENCOUNTER (OUTPATIENT)
Dept: INFUSION THERAPY | Age: 85
Discharge: HOME | End: 2020-10-05
Payer: MEDICARE

## 2020-10-05 VITALS
DIASTOLIC BLOOD PRESSURE: 80 MMHG | TEMPERATURE: 97.2 F | HEART RATE: 79 BPM | RESPIRATION RATE: 16 BRPM | WEIGHT: 153.6 LBS | HEIGHT: 70 IN | OXYGEN SATURATION: 96 % | BODY MASS INDEX: 21.99 KG/M2 | SYSTOLIC BLOOD PRESSURE: 134 MMHG

## 2020-10-05 PROCEDURE — 99214 OFFICE O/P EST MOD 30 MIN: CPT | Performed by: INTERNAL MEDICINE

## 2020-10-05 PROCEDURE — 99211 OFF/OP EST MAY X REQ PHY/QHP: CPT

## 2020-10-05 RX ORDER — CHLORHEXIDINE GLUCONATE 0.12 MG/ML
RINSE ORAL
COMMUNITY
Start: 2020-08-26

## 2020-10-05 RX ORDER — DEXAMETHASONE 2 MG/1
2 TABLET ORAL 2 TIMES DAILY WITH MEALS
Qty: 30 TABLET | Refills: 1 | Status: SHIPPED | OUTPATIENT
Start: 2020-10-05

## 2020-10-05 ASSESSMENT — PATIENT HEALTH QUESTIONNAIRE - PHQ9
SUM OF ALL RESPONSES TO PHQ QUESTIONS 1-9: 0
1. LITTLE INTEREST OR PLEASURE IN DOING THINGS: 0
SUM OF ALL RESPONSES TO PHQ QUESTIONS 1-9: 0
SUM OF ALL RESPONSES TO PHQ9 QUESTIONS 1 & 2: 0
2. FEELING DOWN, DEPRESSED OR HOPELESS: 0

## 2020-10-07 ENCOUNTER — HOSPITAL ENCOUNTER (OUTPATIENT)
Age: 85
Discharge: HOME | End: 2020-10-07
Payer: MEDICARE

## 2020-10-07 LAB
ALBUMIN SERPL-MCNC: 3.8 GM/DL (ref 3.4–5)
ALP BLD-CCNC: 101 IU/L (ref 40–129)
ALT SERPL-CCNC: 31 U/L (ref 10–40)
ANION GAP SERPL CALCULATED.3IONS-SCNC: 12 MMOL/L (ref 4–16)
AST SERPL-CCNC: 28 IU/L (ref 15–37)
BASOPHILS ABSOLUTE: 0 K/CU MM
BASOPHILS RELATIVE PERCENT: 0.2 % (ref 0–1)
BILIRUB SERPL-MCNC: 0.4 MG/DL (ref 0–1)
BUN BLDV-MCNC: 28 MG/DL (ref 6–23)
CALCIUM SERPL-MCNC: 8.9 MG/DL (ref 8.3–10.6)
CHLORIDE BLD-SCNC: 94 MMOL/L (ref 99–110)
CO2: 27 MMOL/L (ref 21–32)
CREAT SERPL-MCNC: 0.9 MG/DL (ref 0.9–1.3)
DIFFERENTIAL TYPE: ABNORMAL
EOSINOPHILS ABSOLUTE: 0 K/CU MM
EOSINOPHILS RELATIVE PERCENT: 0.1 % (ref 0–3)
GFR AFRICAN AMERICAN: >60 ML/MIN/1.73M2
GFR NON-AFRICAN AMERICAN: >60 ML/MIN/1.73M2
GLUCOSE FASTING: 212 MG/DL (ref 70–99)
HCT VFR BLD CALC: 38.2 % (ref 42–52)
HEMOGLOBIN: 12.3 GM/DL (ref 13.5–18)
IMMATURE NEUTROPHIL %: 3.9 % (ref 0–0.43)
INR BLD: 2.18 INDEX
LACTATE DEHYDROGENASE: 346 IU/L (ref 120–246)
LYMPHOCYTES ABSOLUTE: 0.3 K/CU MM
LYMPHOCYTES RELATIVE PERCENT: 3.4 % (ref 24–44)
MCH RBC QN AUTO: 28.4 PG (ref 27–31)
MCHC RBC AUTO-ENTMCNC: 32.2 % (ref 32–36)
MCV RBC AUTO: 88.2 FL (ref 78–100)
MONOCYTES ABSOLUTE: 0.6 K/CU MM
MONOCYTES RELATIVE PERCENT: 6.6 % (ref 0–4)
PDW BLD-RTO: 17.7 % (ref 11.7–14.9)
PLATELET # BLD: 196 K/CU MM (ref 140–440)
PMV BLD AUTO: 9.6 FL (ref 7.5–11.1)
POTASSIUM SERPL-SCNC: 4.9 MMOL/L (ref 3.5–5.1)
PROTHROMBIN TIME: 25.2 SECONDS (ref 11.7–14.5)
RBC # BLD: 4.33 M/CU MM (ref 4.6–6.2)
SEGMENTED NEUTROPHILS ABSOLUTE COUNT: 8.4 K/CU MM
SEGMENTED NEUTROPHILS RELATIVE PERCENT: 85.8 % (ref 36–66)
SODIUM BLD-SCNC: 133 MMOL/L (ref 135–145)
TOTAL IMMATURE NEUTOROPHIL: 0.38 K/CU MM
TOTAL PROTEIN: 5.6 GM/DL (ref 6.4–8.2)
WBC # BLD: 9.8 K/CU MM (ref 4–10.5)

## 2020-10-07 PROCEDURE — 80053 COMPREHEN METABOLIC PANEL: CPT

## 2020-10-07 PROCEDURE — 85610 PROTHROMBIN TIME: CPT

## 2020-10-07 PROCEDURE — 83615 LACTATE (LD) (LDH) ENZYME: CPT

## 2020-10-07 PROCEDURE — 85025 COMPLETE CBC W/AUTO DIFF WBC: CPT

## 2020-10-07 PROCEDURE — 36415 COLL VENOUS BLD VENIPUNCTURE: CPT

## 2020-10-08 ENCOUNTER — APPOINTMENT (OUTPATIENT)
Dept: GENERAL RADIOLOGY | Age: 85
End: 2020-10-08
Payer: MEDICARE

## 2020-10-08 ENCOUNTER — APPOINTMENT (OUTPATIENT)
Dept: CT IMAGING | Age: 85
End: 2020-10-08
Payer: MEDICARE

## 2020-10-08 ENCOUNTER — HOSPITAL ENCOUNTER (EMERGENCY)
Age: 85
Discharge: ANOTHER HOSPITAL | End: 2020-10-09
Attending: EMERGENCY MEDICINE
Payer: MEDICARE

## 2020-10-08 LAB
ALBUMIN SERPL-MCNC: 3.3 GM/DL (ref 3.4–5)
ALP BLD-CCNC: 106 IU/L (ref 40–129)
ALT SERPL-CCNC: 32 U/L (ref 10–40)
ANION GAP SERPL CALCULATED.3IONS-SCNC: 12 MMOL/L (ref 4–16)
APTT: 32.1 SECONDS (ref 25.1–37.1)
AST SERPL-CCNC: 29 IU/L (ref 15–37)
BASOPHILS ABSOLUTE: 0 K/CU MM
BASOPHILS RELATIVE PERCENT: 0.1 % (ref 0–1)
BILIRUB SERPL-MCNC: 0.3 MG/DL (ref 0–1)
BUN BLDV-MCNC: 31 MG/DL (ref 6–23)
CALCIUM SERPL-MCNC: 9 MG/DL (ref 8.3–10.6)
CHLORIDE BLD-SCNC: 92 MMOL/L (ref 99–110)
CO2: 26 MMOL/L (ref 21–32)
CREAT SERPL-MCNC: 1 MG/DL (ref 0.9–1.3)
DIFFERENTIAL TYPE: ABNORMAL
EOSINOPHILS ABSOLUTE: 0 K/CU MM
EOSINOPHILS RELATIVE PERCENT: 0 % (ref 0–3)
GFR AFRICAN AMERICAN: >60 ML/MIN/1.73M2
GFR NON-AFRICAN AMERICAN: >60 ML/MIN/1.73M2
GLUCOSE BLD-MCNC: 365 MG/DL (ref 70–99)
HCT VFR BLD CALC: 36.7 % (ref 42–52)
HEMOGLOBIN: 11.8 GM/DL (ref 13.5–18)
IMMATURE NEUTROPHIL %: 4.2 % (ref 0–0.43)
INR BLD: 2.08 INDEX
LYMPHOCYTES ABSOLUTE: 0.2 K/CU MM
LYMPHOCYTES RELATIVE PERCENT: 2.5 % (ref 24–44)
MCH RBC QN AUTO: 28.4 PG (ref 27–31)
MCHC RBC AUTO-ENTMCNC: 32.2 % (ref 32–36)
MCV RBC AUTO: 88.4 FL (ref 78–100)
MONOCYTES ABSOLUTE: 0.5 K/CU MM
MONOCYTES RELATIVE PERCENT: 5.4 % (ref 0–4)
PDW BLD-RTO: 17.6 % (ref 11.7–14.9)
PLATELET # BLD: 168 K/CU MM (ref 140–440)
PMV BLD AUTO: 9.6 FL (ref 7.5–11.1)
POTASSIUM SERPL-SCNC: 5.4 MMOL/L (ref 3.5–5.1)
PRO-BNP: 3168 PG/ML
PROTHROMBIN TIME: 24.1 SECONDS (ref 11.7–14.5)
RBC # BLD: 4.15 M/CU MM (ref 4.6–6.2)
SEGMENTED NEUTROPHILS ABSOLUTE COUNT: 8.6 K/CU MM
SEGMENTED NEUTROPHILS RELATIVE PERCENT: 87.8 % (ref 36–66)
SODIUM BLD-SCNC: 130 MMOL/L (ref 135–145)
TOTAL IMMATURE NEUTOROPHIL: 0.41 K/CU MM
TOTAL PROTEIN: 5.7 GM/DL (ref 6.4–8.2)
TROPONIN T: 0.01 NG/ML
WBC # BLD: 9.8 K/CU MM (ref 4–10.5)

## 2020-10-08 PROCEDURE — 80053 COMPREHEN METABOLIC PANEL: CPT

## 2020-10-08 PROCEDURE — 93005 ELECTROCARDIOGRAM TRACING: CPT | Performed by: EMERGENCY MEDICINE

## 2020-10-08 PROCEDURE — 99284 EMERGENCY DEPT VISIT MOD MDM: CPT

## 2020-10-08 PROCEDURE — 70450 CT HEAD/BRAIN W/O DYE: CPT

## 2020-10-08 PROCEDURE — 85730 THROMBOPLASTIN TIME PARTIAL: CPT

## 2020-10-08 PROCEDURE — 71100 X-RAY EXAM RIBS UNI 2 VIEWS: CPT

## 2020-10-08 PROCEDURE — 83880 ASSAY OF NATRIURETIC PEPTIDE: CPT

## 2020-10-08 PROCEDURE — 70486 CT MAXILLOFACIAL W/O DYE: CPT

## 2020-10-08 PROCEDURE — P9017 PLASMA 1 DONOR FRZ W/IN 8 HR: HCPCS

## 2020-10-08 PROCEDURE — 73030 X-RAY EXAM OF SHOULDER: CPT

## 2020-10-08 PROCEDURE — 85610 PROTHROMBIN TIME: CPT

## 2020-10-08 PROCEDURE — 86850 RBC ANTIBODY SCREEN: CPT

## 2020-10-08 PROCEDURE — 73130 X-RAY EXAM OF HAND: CPT

## 2020-10-08 PROCEDURE — 72125 CT NECK SPINE W/O DYE: CPT

## 2020-10-08 PROCEDURE — 85025 COMPLETE CBC W/AUTO DIFF WBC: CPT

## 2020-10-08 PROCEDURE — 71046 X-RAY EXAM CHEST 2 VIEWS: CPT

## 2020-10-08 PROCEDURE — 86901 BLOOD TYPING SEROLOGIC RH(D): CPT

## 2020-10-08 PROCEDURE — 84484 ASSAY OF TROPONIN QUANT: CPT

## 2020-10-08 RX ORDER — 0.9 % SODIUM CHLORIDE 0.9 %
20 INTRAVENOUS SOLUTION INTRAVENOUS ONCE
Status: COMPLETED | OUTPATIENT
Start: 2020-10-08 | End: 2020-10-09

## 2020-10-08 RX ORDER — PHYTONADIONE 10 MG/ML
10 INJECTION, EMULSION INTRAMUSCULAR; INTRAVENOUS; SUBCUTANEOUS ONCE
Status: COMPLETED | OUTPATIENT
Start: 2020-10-08 | End: 2020-10-09

## 2020-10-08 RX ORDER — ATROPINE SULFATE 0.1 MG/ML
0.5 INJECTION INTRAVENOUS ONCE
Status: DISCONTINUED | OUTPATIENT
Start: 2020-10-08 | End: 2020-10-09 | Stop reason: HOSPADM

## 2020-10-08 ASSESSMENT — PAIN DESCRIPTION - PAIN TYPE: TYPE: ACUTE PAIN

## 2020-10-08 ASSESSMENT — PAIN SCALES - GENERAL: PAINLEVEL_OUTOF10: 3

## 2020-10-08 ASSESSMENT — PAIN DESCRIPTION - DESCRIPTORS: DESCRIPTORS: SORE

## 2020-10-08 ASSESSMENT — PAIN DESCRIPTION - ORIENTATION: ORIENTATION: LEFT

## 2020-10-08 ASSESSMENT — PAIN DESCRIPTION - LOCATION: LOCATION: SHOULDER

## 2020-10-09 ENCOUNTER — TELEPHONE (OUTPATIENT)
Dept: CARDIOLOGY CLINIC | Age: 85
End: 2020-10-09

## 2020-10-09 VITALS
SYSTOLIC BLOOD PRESSURE: 112 MMHG | DIASTOLIC BLOOD PRESSURE: 54 MMHG | HEIGHT: 70 IN | RESPIRATION RATE: 19 BRPM | TEMPERATURE: 97.7 F | BODY MASS INDEX: 21.9 KG/M2 | HEART RATE: 50 BPM | WEIGHT: 153 LBS | OXYGEN SATURATION: 95 %

## 2020-10-09 LAB
EKG ATRIAL RATE: 35 BPM
EKG DIAGNOSIS: NORMAL
EKG Q-T INTERVAL: 488 MS
EKG QRS DURATION: 180 MS
EKG QTC CALCULATION (BAZETT): 444 MS
EKG R AXIS: -64 DEGREES
EKG T AXIS: 106 DEGREES
EKG VENTRICULAR RATE: 50 BPM

## 2020-10-09 PROCEDURE — 96372 THER/PROPH/DIAG INJ SC/IM: CPT

## 2020-10-09 PROCEDURE — 93010 ELECTROCARDIOGRAM REPORT: CPT | Performed by: INTERNAL MEDICINE

## 2020-10-09 PROCEDURE — 96365 THER/PROPH/DIAG IV INF INIT: CPT

## 2020-10-09 PROCEDURE — 6360000002 HC RX W HCPCS: Performed by: EMERGENCY MEDICINE

## 2020-10-09 PROCEDURE — 2580000003 HC RX 258: Performed by: EMERGENCY MEDICINE

## 2020-10-09 RX ADMIN — PHYTONADIONE 10 MG: 10 INJECTION, EMULSION INTRAMUSCULAR; INTRAVENOUS; SUBCUTANEOUS at 00:02

## 2020-10-09 RX ADMIN — LEVETIRACETAM 1000 MG: 100 INJECTION, SOLUTION, CONCENTRATE INTRAVENOUS at 00:10

## 2020-10-09 RX ADMIN — SODIUM CHLORIDE 20 ML: 9 INJECTION, SOLUTION INTRAVENOUS at 00:04

## 2020-10-09 ASSESSMENT — ENCOUNTER SYMPTOMS
EYE PAIN: 0
EYE ITCHING: 0
BLOOD IN STOOL: 0
BACK PAIN: 0
EYES NEGATIVE: 1
BOWEL INCONTINENCE: 0
COUGH: 0
NAUSEA: 0
APNEA: 0
PHOTOPHOBIA: 0
RESPIRATORY NEGATIVE: 1
EYE DISCHARGE: 0
VOMITING: 0
RECTAL PAIN: 0
DIARRHEA: 0
CHEST TIGHTNESS: 0
COLOR CHANGE: 0
TROUBLE SWALLOWING: 0
GASTROINTESTINAL NEGATIVE: 1
SHORTNESS OF BREATH: 0
EYE REDNESS: 0
WHEEZING: 0
SINUS PAIN: 0
CONSTIPATION: 0
FACIAL SWELLING: 0
RHINORRHEA: 0
VOICE CHANGE: 0
ABDOMINAL PAIN: 0
VISUAL CHANGE: 0
CHOKING: 0
SINUS PRESSURE: 0
STRIDOR: 0

## 2020-10-09 NOTE — ED TRIAGE NOTES
Patient arrived to room 7-2 by [facility] after a fall at home. Per EMS patient was performing shoulder stretches when he lost his balance. Patient complaining of left shoulder pain at this time. Patient unaware of events before, during or after fall but A&O x4 on arrival. Patient was changed into hospital gown and placed on continuous pulse ox/BP monitor. Patient has bruising to the left shoulder, above the left ear and right thumb with a skin tear.

## 2020-10-09 NOTE — TELEPHONE ENCOUNTER
Patient had Fall and had subdural and arachnoid hemorrhage and flown to [facility] over night. INR was 2.9,  Now 1.2  Holding coumadin due to bleeding  Neurology to recheck CT Head in 10 days to determine if Coumadin can be restarted. Requested recommendations per neurology CT findings thereafter.

## 2020-10-09 NOTE — ED NOTES
Assisted patient to the side of bed to use urinal, patient unable to provide urine sample at this time. Patients pants removed, underwear still in place. Patient assisted back to position of comfort. Geetha Morales, radiology tech escorted patient to radiology via bed, no monitor per Dr. Radha Sims.      [place], RN  10/08/20 [id number]

## 2020-10-09 NOTE — ED NOTES
VO to hold nicardipine unless S B/P above 160. Notified Dr Jairo Greenberg of delay to get FFP for pt and we will hold off on getting it for pt D/T him being transferred to [facility].                                                   Mekhi Do, RN  10/09/20 [address] [place] [address], RN  10/09/20 [address] [place] [address], RN  10/09/20 [phone]

## 2020-10-09 NOTE — ED NOTES
Dr Gomez Miguel in to talk with pt and wife about CT results and plan to get pt transferred to [facility].      Clarita Jones RN  10/09/20 [phone]

## 2020-10-09 NOTE — ED NOTES
[facility] here to transport pt. Report and paperwork given to take to [facility].      Jim Murcia RN  10/09/20 [phone]

## 2020-10-09 NOTE — ED NOTES
Call placed to [facility] Transfer center per physician request. Dr. Hussain Miller is currently talking with them       Renelda Holter, RN  10/09/20 0528

## 2020-10-09 NOTE — ED NOTES
Pt's wife out to the desk and is concerned because Pt's HR has read 29 and 30 per pulse ox. When checking pt he is alert and oriented. Cardiac monitor applied and pt is pacing at 50. Dr Mike Condon notified and orders received.      Nelly Quiles, RN  10/08/20 Timothy Abbott, RN  10/08/20 [phone]

## 2021-07-13 NOTE — PROGRESS NOTES
Here for Rituxan/Zometa. Voiced he needs dexamethasone refill, wants to know if he can get flu shot. Refill sent. Dr. Pat Sheldon voiced he does not want pt to have flu shot. Pt made aware. Labs WDL for treatment. Pt voiced no complaints. Pt agreeable to POC. Call light in reach, needs met. Treatment initiated.
no

## 2025-01-20 NOTE — ASSESSMENT & PLAN NOTE
Clinically stable. Continue risk modification for secondary prevention.
Follows up with oncology and seem to be stable. Recent CT abdomen results are reviewed and discussed with him.
Last LDL was 52 suggesting lipid status is fairly well controlled. Continue current therapy.
Rate is well controlled and he is anticoagulated. Continue the same. Last INR was subtherapeutic. He follows up with PCP.
Working well. Has remaining longevity of the 27 months. Continue to monitor as per care link schedule. 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
<--- Click to Launch ICDx for PreOp, PostOp and Procedure
<--- Click to Launch ICDx for PreOp, PostOp and Procedure